# Patient Record
Sex: MALE | Race: WHITE | NOT HISPANIC OR LATINO | URBAN - NONMETROPOLITAN AREA
[De-identification: names, ages, dates, MRNs, and addresses within clinical notes are randomized per-mention and may not be internally consistent; named-entity substitution may affect disease eponyms.]

---

## 2023-08-01 ENCOUNTER — HOSPITAL ENCOUNTER (INPATIENT)
Facility: HOSPITAL | Age: 38
LOS: 2 days | Discharge: PSYCHIATRIC OR SUBSTANCE USE FACILITY | DRG: 897 | End: 2023-08-03
Attending: STUDENT IN AN ORGANIZED HEALTH CARE EDUCATION/TRAINING PROGRAM | Admitting: HOSPITALIST
Payer: COMMERCIAL

## 2023-08-01 DIAGNOSIS — F10.931 ALCOHOL WITHDRAWAL SYNDROME, WITH DELIRIUM: Primary | ICD-10-CM

## 2023-08-01 PROBLEM — F10.929 ALCOHOL INTOXICATION: Status: ACTIVE | Noted: 2023-08-01

## 2023-08-01 LAB
ALBUMIN SERPL-MCNC: 4.8 G/DL (ref 3.5–5.2)
ALBUMIN/GLOB SERPL: 1.7 G/DL
ALP SERPL-CCNC: 61 U/L (ref 39–117)
ALT SERPL W P-5'-P-CCNC: 47 U/L (ref 1–41)
AMPHET+METHAMPHET UR QL: NEGATIVE
AMPHETAMINES UR QL: NEGATIVE
ANION GAP SERPL CALCULATED.3IONS-SCNC: 19.4 MMOL/L (ref 5–15)
APAP SERPL-MCNC: <5 MCG/ML (ref 0–30)
AST SERPL-CCNC: 92 U/L (ref 1–40)
BARBITURATES UR QL SCN: NEGATIVE
BASOPHILS # BLD AUTO: 0.04 10*3/MM3 (ref 0–0.2)
BASOPHILS NFR BLD AUTO: 1.5 % (ref 0–1.5)
BENZODIAZ UR QL SCN: NEGATIVE
BILIRUB SERPL-MCNC: 0.4 MG/DL (ref 0–1.2)
BUN SERPL-MCNC: 12 MG/DL (ref 6–20)
BUN/CREAT SERPL: 19.4 (ref 7–25)
BUPRENORPHINE SERPL-MCNC: NEGATIVE NG/ML
CALCIUM SPEC-SCNC: 9.5 MG/DL (ref 8.6–10.5)
CANNABINOIDS SERPL QL: NEGATIVE
CHLORIDE SERPL-SCNC: 99 MMOL/L (ref 98–107)
CK SERPL-CCNC: 112 U/L (ref 20–200)
CO2 SERPL-SCNC: 26.6 MMOL/L (ref 22–29)
COCAINE UR QL: NEGATIVE
CREAT SERPL-MCNC: 0.62 MG/DL (ref 0.76–1.27)
D-LACTATE SERPL-SCNC: 3.3 MMOL/L (ref 0.5–2)
DEPRECATED RDW RBC AUTO: 52 FL (ref 37–54)
EGFRCR SERPLBLD CKD-EPI 2021: 124.7 ML/MIN/1.73
EOSINOPHIL # BLD AUTO: 0.01 10*3/MM3 (ref 0–0.4)
EOSINOPHIL NFR BLD AUTO: 0.4 % (ref 0.3–6.2)
ERYTHROCYTE [DISTWIDTH] IN BLOOD BY AUTOMATED COUNT: 14.5 % (ref 12.3–15.4)
ETHANOL BLD-MCNC: 574 MG/DL (ref 0–10)
ETHANOL UR QL: 0.57 %
FENTANYL UR-MCNC: NEGATIVE NG/ML
GLOBULIN UR ELPH-MCNC: 2.9 GM/DL
GLUCOSE BLDC GLUCOMTR-MCNC: 134 MG/DL (ref 70–130)
GLUCOSE SERPL-MCNC: 147 MG/DL (ref 65–99)
HCT VFR BLD AUTO: 40.2 % (ref 37.5–51)
HGB BLD-MCNC: 13.7 G/DL (ref 13–17.7)
HOLD SPECIMEN: NORMAL
HOLD SPECIMEN: NORMAL
IMM GRANULOCYTES # BLD AUTO: 0 10*3/MM3 (ref 0–0.05)
IMM GRANULOCYTES NFR BLD AUTO: 0 % (ref 0–0.5)
LIPASE SERPL-CCNC: 80 U/L (ref 13–60)
LYMPHOCYTES # BLD AUTO: 1.01 10*3/MM3 (ref 0.7–3.1)
LYMPHOCYTES NFR BLD AUTO: 38 % (ref 19.6–45.3)
MCH RBC QN AUTO: 33 PG (ref 26.6–33)
MCHC RBC AUTO-ENTMCNC: 34.1 G/DL (ref 31.5–35.7)
MCV RBC AUTO: 96.9 FL (ref 79–97)
METHADONE UR QL SCN: NEGATIVE
MONOCYTES # BLD AUTO: 0.25 10*3/MM3 (ref 0.1–0.9)
MONOCYTES NFR BLD AUTO: 9.4 % (ref 5–12)
NEUTROPHILS NFR BLD AUTO: 1.35 10*3/MM3 (ref 1.7–7)
NEUTROPHILS NFR BLD AUTO: 50.7 % (ref 42.7–76)
NRBC BLD AUTO-RTO: 0 /100 WBC (ref 0–0.2)
OPIATES UR QL: NEGATIVE
OXYCODONE UR QL SCN: NEGATIVE
PCP UR QL SCN: NEGATIVE
PLATELET # BLD AUTO: 149 10*3/MM3 (ref 140–450)
PMV BLD AUTO: 10.3 FL (ref 6–12)
POTASSIUM SERPL-SCNC: 3.9 MMOL/L (ref 3.5–5.2)
PROPOXYPH UR QL: NEGATIVE
PROT SERPL-MCNC: 7.7 G/DL (ref 6–8.5)
RBC # BLD AUTO: 4.15 10*6/MM3 (ref 4.14–5.8)
SALICYLATES SERPL-MCNC: <0.3 MG/DL
SODIUM SERPL-SCNC: 145 MMOL/L (ref 136–145)
TRICYCLICS UR QL SCN: NEGATIVE
TSH SERPL DL<=0.05 MIU/L-ACNC: 0.61 UIU/ML (ref 0.27–4.2)
WBC NRBC COR # BLD: 2.66 10*3/MM3 (ref 3.4–10.8)
WHOLE BLOOD HOLD COAG: NORMAL
WHOLE BLOOD HOLD SPECIMEN: NORMAL

## 2023-08-01 PROCEDURE — 25010000002 THIAMINE PER 100 MG: Performed by: STUDENT IN AN ORGANIZED HEALTH CARE EDUCATION/TRAINING PROGRAM

## 2023-08-01 PROCEDURE — 93005 ELECTROCARDIOGRAM TRACING: CPT | Performed by: HOSPITALIST

## 2023-08-01 PROCEDURE — HZ2ZZZZ DETOXIFICATION SERVICES FOR SUBSTANCE ABUSE TREATMENT: ICD-10-PCS | Performed by: STUDENT IN AN ORGANIZED HEALTH CARE EDUCATION/TRAINING PROGRAM

## 2023-08-01 PROCEDURE — 80050 GENERAL HEALTH PANEL: CPT | Performed by: STUDENT IN AN ORGANIZED HEALTH CARE EDUCATION/TRAINING PROGRAM

## 2023-08-01 PROCEDURE — 82948 REAGENT STRIP/BLOOD GLUCOSE: CPT

## 2023-08-01 PROCEDURE — 83605 ASSAY OF LACTIC ACID: CPT | Performed by: STUDENT IN AN ORGANIZED HEALTH CARE EDUCATION/TRAINING PROGRAM

## 2023-08-01 PROCEDURE — 82077 ASSAY SPEC XCP UR&BREATH IA: CPT | Performed by: STUDENT IN AN ORGANIZED HEALTH CARE EDUCATION/TRAINING PROGRAM

## 2023-08-01 PROCEDURE — 80143 DRUG ASSAY ACETAMINOPHEN: CPT | Performed by: STUDENT IN AN ORGANIZED HEALTH CARE EDUCATION/TRAINING PROGRAM

## 2023-08-01 PROCEDURE — 80179 DRUG ASSAY SALICYLATE: CPT | Performed by: STUDENT IN AN ORGANIZED HEALTH CARE EDUCATION/TRAINING PROGRAM

## 2023-08-01 PROCEDURE — 80307 DRUG TEST PRSMV CHEM ANLYZR: CPT | Performed by: STUDENT IN AN ORGANIZED HEALTH CARE EDUCATION/TRAINING PROGRAM

## 2023-08-01 PROCEDURE — 25010000002 LORAZEPAM PER 2 MG: Performed by: STUDENT IN AN ORGANIZED HEALTH CARE EDUCATION/TRAINING PROGRAM

## 2023-08-01 PROCEDURE — 93010 ELECTROCARDIOGRAM REPORT: CPT | Performed by: SPECIALIST

## 2023-08-01 PROCEDURE — 83690 ASSAY OF LIPASE: CPT | Performed by: HOSPITALIST

## 2023-08-01 PROCEDURE — 99285 EMERGENCY DEPT VISIT HI MDM: CPT

## 2023-08-01 PROCEDURE — 82550 ASSAY OF CK (CPK): CPT | Performed by: STUDENT IN AN ORGANIZED HEALTH CARE EDUCATION/TRAINING PROGRAM

## 2023-08-01 RX ORDER — IBUPROFEN 800 MG/1
800 TABLET ORAL EVERY 8 HOURS PRN
COMMUNITY

## 2023-08-01 RX ORDER — LORAZEPAM 1 MG/1
1 TABLET ORAL
Status: DISCONTINUED | OUTPATIENT
Start: 2023-08-01 | End: 2023-08-03 | Stop reason: HOSPADM

## 2023-08-01 RX ORDER — BUSPIRONE HYDROCHLORIDE 15 MG/1
15 TABLET ORAL 3 TIMES DAILY PRN
COMMUNITY

## 2023-08-01 RX ORDER — LORAZEPAM 2 MG/1
4 TABLET ORAL
Status: DISCONTINUED | OUTPATIENT
Start: 2023-08-01 | End: 2023-08-03 | Stop reason: HOSPADM

## 2023-08-01 RX ORDER — FOLIC ACID 1 MG/1
1 TABLET ORAL DAILY
Status: DISCONTINUED | OUTPATIENT
Start: 2023-08-01 | End: 2023-08-01

## 2023-08-01 RX ORDER — THIAMINE HYDROCHLORIDE 100 MG/ML
200 INJECTION, SOLUTION INTRAMUSCULAR; INTRAVENOUS EVERY 8 HOURS SCHEDULED
Status: DISCONTINUED | OUTPATIENT
Start: 2023-08-04 | End: 2023-08-01

## 2023-08-01 RX ORDER — ESCITALOPRAM OXALATE 10 MG/1
10 TABLET ORAL DAILY
COMMUNITY

## 2023-08-01 RX ORDER — TRAZODONE HYDROCHLORIDE 50 MG/1
50-100 TABLET ORAL NIGHTLY PRN
Status: ON HOLD | COMMUNITY
End: 2023-08-08 | Stop reason: SDUPTHER

## 2023-08-01 RX ORDER — DIPHENOXYLATE HYDROCHLORIDE AND ATROPINE SULFATE 2.5; .025 MG/1; MG/1
1 TABLET ORAL DAILY
COMMUNITY

## 2023-08-01 RX ORDER — LORAZEPAM 2 MG/ML
1 INJECTION INTRAMUSCULAR EVERY 6 HOURS
Status: DISCONTINUED | OUTPATIENT
Start: 2023-08-02 | End: 2023-08-01

## 2023-08-01 RX ORDER — LEVETIRACETAM 500 MG/1
500 TABLET ORAL 2 TIMES DAILY
COMMUNITY

## 2023-08-01 RX ORDER — SODIUM CHLORIDE 0.9 % (FLUSH) 0.9 %
10 SYRINGE (ML) INJECTION AS NEEDED
Status: DISCONTINUED | OUTPATIENT
Start: 2023-08-01 | End: 2023-08-03 | Stop reason: HOSPADM

## 2023-08-01 RX ORDER — LORAZEPAM 2 MG/ML
2 INJECTION INTRAMUSCULAR
Status: DISCONTINUED | OUTPATIENT
Start: 2023-08-01 | End: 2023-08-03 | Stop reason: HOSPADM

## 2023-08-01 RX ORDER — LORAZEPAM 2 MG/1
2 TABLET ORAL
Status: DISCONTINUED | OUTPATIENT
Start: 2023-08-01 | End: 2023-08-03 | Stop reason: HOSPADM

## 2023-08-01 RX ORDER — NICOTINE 21 MG/24HR
1 PATCH, TRANSDERMAL 24 HOURS TRANSDERMAL ONCE
Status: COMPLETED | OUTPATIENT
Start: 2023-08-01 | End: 2023-08-02

## 2023-08-01 RX ORDER — ALBUTEROL SULFATE 90 UG/1
2 AEROSOL, METERED RESPIRATORY (INHALATION) EVERY 4 HOURS PRN
COMMUNITY

## 2023-08-01 RX ORDER — LORAZEPAM 2 MG/ML
1 INJECTION INTRAMUSCULAR
Status: DISCONTINUED | OUTPATIENT
Start: 2023-08-01 | End: 2023-08-03 | Stop reason: HOSPADM

## 2023-08-01 RX ORDER — LORAZEPAM 2 MG/ML
2 INJECTION INTRAMUSCULAR EVERY 6 HOURS
Status: DISCONTINUED | OUTPATIENT
Start: 2023-08-01 | End: 2023-08-01

## 2023-08-01 RX ORDER — SODIUM CHLORIDE, SODIUM LACTATE, POTASSIUM CHLORIDE, CALCIUM CHLORIDE 600; 310; 30; 20 MG/100ML; MG/100ML; MG/100ML; MG/100ML
100 INJECTION, SOLUTION INTRAVENOUS CONTINUOUS
Status: ACTIVE | OUTPATIENT
Start: 2023-08-01 | End: 2023-08-02

## 2023-08-01 RX ORDER — LORAZEPAM 2 MG/ML
4 INJECTION INTRAMUSCULAR
Status: DISCONTINUED | OUTPATIENT
Start: 2023-08-01 | End: 2023-08-03 | Stop reason: HOSPADM

## 2023-08-01 RX ORDER — METHION/INOS/CHOL BT/B COM/LIV 110MG-86MG
100 CAPSULE ORAL DAILY
Status: DISCONTINUED | OUTPATIENT
Start: 2023-08-09 | End: 2023-08-01

## 2023-08-01 RX ADMIN — THIAMINE HYDROCHLORIDE 500 MG: 100 INJECTION, SOLUTION INTRAMUSCULAR; INTRAVENOUS at 22:13

## 2023-08-01 RX ADMIN — Medication 1 PATCH: at 21:09

## 2023-08-01 RX ADMIN — SODIUM CHLORIDE 2000 ML: 9 INJECTION, SOLUTION INTRAVENOUS at 21:08

## 2023-08-01 RX ADMIN — LORAZEPAM 1 MG: 2 INJECTION INTRAMUSCULAR; INTRAVENOUS at 21:09

## 2023-08-01 RX ADMIN — SODIUM CHLORIDE, POTASSIUM CHLORIDE, SODIUM LACTATE AND CALCIUM CHLORIDE 100 ML/HR: 600; 310; 30; 20 INJECTION, SOLUTION INTRAVENOUS at 23:14

## 2023-08-02 PROBLEM — E43 SEVERE MALNUTRITION: Status: ACTIVE | Noted: 2023-08-02

## 2023-08-02 LAB
ALBUMIN SERPL-MCNC: 4.2 G/DL (ref 3.5–5.2)
ALBUMIN/GLOB SERPL: 1.8 G/DL
ALP SERPL-CCNC: 52 U/L (ref 39–117)
ALT SERPL W P-5'-P-CCNC: 39 U/L (ref 1–41)
ANION GAP SERPL CALCULATED.3IONS-SCNC: 15 MMOL/L (ref 5–15)
AST SERPL-CCNC: 77 U/L (ref 1–40)
BASOPHILS # BLD AUTO: 0.04 10*3/MM3 (ref 0–0.2)
BASOPHILS NFR BLD AUTO: 1.5 % (ref 0–1.5)
BILIRUB SERPL-MCNC: 0.4 MG/DL (ref 0–1.2)
BUN SERPL-MCNC: 6 MG/DL (ref 6–20)
BUN/CREAT SERPL: 15.8 (ref 7–25)
CALCIUM SPEC-SCNC: 7.8 MG/DL (ref 8.6–10.5)
CHLORIDE SERPL-SCNC: 103 MMOL/L (ref 98–107)
CO2 SERPL-SCNC: 24 MMOL/L (ref 22–29)
CREAT SERPL-MCNC: 0.38 MG/DL (ref 0.76–1.27)
D-LACTATE SERPL-SCNC: 2 MMOL/L (ref 0.5–2)
D-LACTATE SERPL-SCNC: 2.5 MMOL/L (ref 0.5–2)
DEPRECATED RDW RBC AUTO: 53.7 FL (ref 37–54)
EGFRCR SERPLBLD CKD-EPI 2021: 144.6 ML/MIN/1.73
EOSINOPHIL # BLD AUTO: 0.05 10*3/MM3 (ref 0–0.4)
EOSINOPHIL NFR BLD AUTO: 1.8 % (ref 0.3–6.2)
ERYTHROCYTE [DISTWIDTH] IN BLOOD BY AUTOMATED COUNT: 14.5 % (ref 12.3–15.4)
GLOBULIN UR ELPH-MCNC: 2.3 GM/DL
GLUCOSE SERPL-MCNC: 82 MG/DL (ref 65–99)
HCT VFR BLD AUTO: 37.5 % (ref 37.5–51)
HGB BLD-MCNC: 12.4 G/DL (ref 13–17.7)
IMM GRANULOCYTES # BLD AUTO: 0 10*3/MM3 (ref 0–0.05)
IMM GRANULOCYTES NFR BLD AUTO: 0 % (ref 0–0.5)
LYMPHOCYTES # BLD AUTO: 1.41 10*3/MM3 (ref 0.7–3.1)
LYMPHOCYTES NFR BLD AUTO: 51.8 % (ref 19.6–45.3)
MAGNESIUM SERPL-MCNC: 1.3 MG/DL (ref 1.6–2.6)
MCH RBC QN AUTO: 33 PG (ref 26.6–33)
MCHC RBC AUTO-ENTMCNC: 33.1 G/DL (ref 31.5–35.7)
MCV RBC AUTO: 99.7 FL (ref 79–97)
MONOCYTES # BLD AUTO: 0.21 10*3/MM3 (ref 0.1–0.9)
MONOCYTES NFR BLD AUTO: 7.7 % (ref 5–12)
NEUTROPHILS NFR BLD AUTO: 1.01 10*3/MM3 (ref 1.7–7)
NEUTROPHILS NFR BLD AUTO: 37.2 % (ref 42.7–76)
NRBC BLD AUTO-RTO: 0 /100 WBC (ref 0–0.2)
PLATELET # BLD AUTO: 118 10*3/MM3 (ref 140–450)
PMV BLD AUTO: 10.7 FL (ref 6–12)
POTASSIUM SERPL-SCNC: 3.9 MMOL/L (ref 3.5–5.2)
PROT SERPL-MCNC: 6.5 G/DL (ref 6–8.5)
RBC # BLD AUTO: 3.76 10*6/MM3 (ref 4.14–5.8)
SODIUM SERPL-SCNC: 142 MMOL/L (ref 136–145)
WBC NRBC COR # BLD: 2.72 10*3/MM3 (ref 3.4–10.8)

## 2023-08-02 PROCEDURE — 80177 DRUG SCRN QUAN LEVETIRACETAM: CPT | Performed by: STUDENT IN AN ORGANIZED HEALTH CARE EDUCATION/TRAINING PROGRAM

## 2023-08-02 PROCEDURE — 80053 COMPREHEN METABOLIC PANEL: CPT | Performed by: HOSPITALIST

## 2023-08-02 PROCEDURE — 85025 COMPLETE CBC W/AUTO DIFF WBC: CPT | Performed by: HOSPITALIST

## 2023-08-02 PROCEDURE — 94799 UNLISTED PULMONARY SVC/PX: CPT

## 2023-08-02 PROCEDURE — 83605 ASSAY OF LACTIC ACID: CPT | Performed by: STUDENT IN AN ORGANIZED HEALTH CARE EDUCATION/TRAINING PROGRAM

## 2023-08-02 PROCEDURE — 99222 1ST HOSP IP/OBS MODERATE 55: CPT | Performed by: PSYCHIATRY & NEUROLOGY

## 2023-08-02 PROCEDURE — 94664 DEMO&/EVAL PT USE INHALER: CPT

## 2023-08-02 PROCEDURE — 25010000002 LORAZEPAM PER 2 MG: Performed by: HOSPITALIST

## 2023-08-02 PROCEDURE — 25010000002 THIAMINE PER 100 MG: Performed by: HOSPITALIST

## 2023-08-02 PROCEDURE — 83735 ASSAY OF MAGNESIUM: CPT | Performed by: HOSPITALIST

## 2023-08-02 PROCEDURE — 25010000002 MAGNESIUM SULFATE 2 GM/50ML SOLUTION: Performed by: HOSPITALIST

## 2023-08-02 PROCEDURE — 94761 N-INVAS EAR/PLS OXIMETRY MLT: CPT

## 2023-08-02 RX ORDER — SODIUM CHLORIDE, SODIUM LACTATE, POTASSIUM CHLORIDE, CALCIUM CHLORIDE 600; 310; 30; 20 MG/100ML; MG/100ML; MG/100ML; MG/100ML
100 INJECTION, SOLUTION INTRAVENOUS CONTINUOUS
Status: DISCONTINUED | OUTPATIENT
Start: 2023-08-02 | End: 2023-08-03

## 2023-08-02 RX ORDER — BISACODYL 5 MG/1
5 TABLET, DELAYED RELEASE ORAL DAILY PRN
Status: DISCONTINUED | OUTPATIENT
Start: 2023-08-02 | End: 2023-08-03 | Stop reason: HOSPADM

## 2023-08-02 RX ORDER — LEVETIRACETAM 500 MG/1
500 TABLET ORAL 2 TIMES DAILY
Status: DISCONTINUED | OUTPATIENT
Start: 2023-08-02 | End: 2023-08-03 | Stop reason: HOSPADM

## 2023-08-02 RX ORDER — METHION/INOS/CHOL BT/B COM/LIV 110MG-86MG
100 CAPSULE ORAL DAILY
Status: CANCELLED | OUTPATIENT
Start: 2023-08-02

## 2023-08-02 RX ORDER — SODIUM CHLORIDE 0.9 % (FLUSH) 0.9 %
10 SYRINGE (ML) INJECTION EVERY 12 HOURS SCHEDULED
Status: DISCONTINUED | OUTPATIENT
Start: 2023-08-02 | End: 2023-08-03 | Stop reason: HOSPADM

## 2023-08-02 RX ORDER — NITROGLYCERIN 0.4 MG/1
0.4 TABLET SUBLINGUAL
Status: DISCONTINUED | OUTPATIENT
Start: 2023-08-02 | End: 2023-08-03 | Stop reason: HOSPADM

## 2023-08-02 RX ORDER — TRAZODONE HYDROCHLORIDE 50 MG/1
50 TABLET ORAL NIGHTLY PRN
Status: DISCONTINUED | OUTPATIENT
Start: 2023-08-02 | End: 2023-08-03 | Stop reason: HOSPADM

## 2023-08-02 RX ORDER — AMOXICILLIN 250 MG
2 CAPSULE ORAL 2 TIMES DAILY
Status: DISCONTINUED | OUTPATIENT
Start: 2023-08-02 | End: 2023-08-03 | Stop reason: HOSPADM

## 2023-08-02 RX ORDER — BISACODYL 10 MG
10 SUPPOSITORY, RECTAL RECTAL DAILY PRN
Status: DISCONTINUED | OUTPATIENT
Start: 2023-08-02 | End: 2023-08-03 | Stop reason: HOSPADM

## 2023-08-02 RX ORDER — ALBUTEROL SULFATE 2.5 MG/3ML
2.5 SOLUTION RESPIRATORY (INHALATION) EVERY 4 HOURS PRN
Status: DISCONTINUED | OUTPATIENT
Start: 2023-08-02 | End: 2023-08-03 | Stop reason: HOSPADM

## 2023-08-02 RX ORDER — IBUPROFEN 800 MG/1
800 TABLET ORAL EVERY 8 HOURS PRN
Status: CANCELLED | OUTPATIENT
Start: 2023-08-02

## 2023-08-02 RX ORDER — ESCITALOPRAM OXALATE 10 MG/1
10 TABLET ORAL DAILY
Status: DISCONTINUED | OUTPATIENT
Start: 2023-08-02 | End: 2023-08-03 | Stop reason: HOSPADM

## 2023-08-02 RX ORDER — DIPHENOXYLATE HYDROCHLORIDE AND ATROPINE SULFATE 2.5; .025 MG/1; MG/1
1 TABLET ORAL DAILY
Status: CANCELLED | OUTPATIENT
Start: 2023-08-02

## 2023-08-02 RX ORDER — NICOTINE 21 MG/24HR
1 PATCH, TRANSDERMAL 24 HOURS TRANSDERMAL EVERY 24 HOURS
Status: DISCONTINUED | OUTPATIENT
Start: 2023-08-02 | End: 2023-08-03 | Stop reason: HOSPADM

## 2023-08-02 RX ORDER — SODIUM CHLORIDE 0.9 % (FLUSH) 0.9 %
10 SYRINGE (ML) INJECTION AS NEEDED
Status: DISCONTINUED | OUTPATIENT
Start: 2023-08-02 | End: 2023-08-03 | Stop reason: HOSPADM

## 2023-08-02 RX ORDER — DIPHENOXYLATE HYDROCHLORIDE AND ATROPINE SULFATE 2.5; .025 MG/1; MG/1
1 TABLET ORAL DAILY
Status: DISCONTINUED | OUTPATIENT
Start: 2023-08-02 | End: 2023-08-03 | Stop reason: HOSPADM

## 2023-08-02 RX ORDER — MAGNESIUM SULFATE HEPTAHYDRATE 40 MG/ML
2 INJECTION, SOLUTION INTRAVENOUS
Status: COMPLETED | OUTPATIENT
Start: 2023-08-02 | End: 2023-08-02

## 2023-08-02 RX ORDER — POLYETHYLENE GLYCOL 3350 17 G/17G
17 POWDER, FOR SOLUTION ORAL DAILY PRN
Status: DISCONTINUED | OUTPATIENT
Start: 2023-08-02 | End: 2023-08-03 | Stop reason: HOSPADM

## 2023-08-02 RX ORDER — SODIUM CHLORIDE 9 MG/ML
40 INJECTION, SOLUTION INTRAVENOUS AS NEEDED
Status: DISCONTINUED | OUTPATIENT
Start: 2023-08-02 | End: 2023-08-03 | Stop reason: HOSPADM

## 2023-08-02 RX ADMIN — LORAZEPAM 2 MG: 2 INJECTION INTRAMUSCULAR; INTRAVENOUS at 21:18

## 2023-08-02 RX ADMIN — THIAMINE HYDROCHLORIDE 100 ML/HR: 100 INJECTION, SOLUTION INTRAMUSCULAR; INTRAVENOUS at 21:14

## 2023-08-02 RX ADMIN — SODIUM CHLORIDE, POTASSIUM CHLORIDE, SODIUM LACTATE AND CALCIUM CHLORIDE 100 ML/HR: 600; 310; 30; 20 INJECTION, SOLUTION INTRAVENOUS at 23:22

## 2023-08-02 RX ADMIN — SODIUM CHLORIDE, POTASSIUM CHLORIDE, SODIUM LACTATE AND CALCIUM CHLORIDE 100 ML/HR: 600; 310; 30; 20 INJECTION, SOLUTION INTRAVENOUS at 12:01

## 2023-08-02 RX ADMIN — MAGNESIUM SULFATE HEPTAHYDRATE 2 G: 40 INJECTION, SOLUTION INTRAVENOUS at 05:02

## 2023-08-02 RX ADMIN — LEVETIRACETAM 500 MG: 500 TABLET, FILM COATED ORAL at 09:27

## 2023-08-02 RX ADMIN — Medication 10 ML: at 09:27

## 2023-08-02 RX ADMIN — Medication 1 TABLET: at 09:27

## 2023-08-02 RX ADMIN — LORAZEPAM 2 MG: 2 INJECTION INTRAMUSCULAR; INTRAVENOUS at 15:27

## 2023-08-02 RX ADMIN — THIAMINE HYDROCHLORIDE 100 ML/HR: 100 INJECTION, SOLUTION INTRAMUSCULAR; INTRAVENOUS at 00:30

## 2023-08-02 RX ADMIN — MAGNESIUM SULFATE HEPTAHYDRATE 2 G: 40 INJECTION, SOLUTION INTRAVENOUS at 09:27

## 2023-08-02 RX ADMIN — MAGNESIUM SULFATE HEPTAHYDRATE 2 G: 40 INJECTION, SOLUTION INTRAVENOUS at 06:54

## 2023-08-02 RX ADMIN — LEVETIRACETAM 500 MG: 500 TABLET, FILM COATED ORAL at 21:14

## 2023-08-02 RX ADMIN — ESCITALOPRAM 10 MG: 10 TABLET, FILM COATED ORAL at 09:27

## 2023-08-02 RX ADMIN — Medication 10 ML: at 21:19

## 2023-08-02 NOTE — PLAN OF CARE
Goal Outcome Evaluation:               Patient admitted to CCU. Pt is alert and oriented and very pleasant. IVF infusing. VSS, urinal within reach.  Bed alarm set. Will continue with plan of care.
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
## 2023-08-02 NOTE — PROGRESS NOTES
[facility] HOSPITALIST PROGRESS NOTE     Patient Identification:  Name:  Sachin Limon  Age:  38 y.o.  Sex:  male  :  1985  MRN:  [id number]  Visit Number:  [id number]  ROOM: [address]     Primary Care Provider:  Provider, No Known     Date of Admission: 2023    Length of stay in inpatient status:  1    Subjective     Chief Compliant:    Chief Complaint   Patient presents with    Alcohol Problem       Patient seen at bedside this am ambulating with standby assist to commode in room with resting and intention tremor noted without significant asterixis. Voice mildly tremulous. Otherwise patient without acute complaints. No nausea or diarrhea reported, no abd pain.        Objective     Current Hospital Meds:  escitalopram, 10 mg, Oral, Daily  levETIRAcetam, 500 mg, Oral, BID  multivitamin, 1 tablet, Oral, Daily  nicotine, 1 patch, Transdermal, Once  nicotine, 1 patch, Transdermal, Q24H  senna-docusate sodium, 2 tablet, Oral, BID  sodium chloride, 10 mL, Intravenous, Q12H  IV Fluids 1000 mL + additives, 100 mL/hr, Intravenous, Nightly    lactated ringers, 100 mL/hr, Last Rate: 100 mL/hr (23 1201)      Current Antimicrobial Therapy:  Anti-Infectives (From admission, onward)      None          Current Diuretic Therapy:  Diuretics (From admission, onward)      None          ----------------------------------------------------------------------------------------------------------------------  Vital Signs:  Temp:  [97.7 øF (36.5 øC)-98.6 øF (37 øC)] 98.4 øF (36.9 øC)  Heart Rate:  [] 81  Resp:  [12-19] 18  BP: (111-152)/(74-97) 138/90  SpO2:  [87 %-100 %] 95 %  on  Flow (L/min):  [2] 2;   Device (Oxygen Therapy): nasal cannula  Body mass index is 18.65 kg/mý.    Wt Readings from Last 3 Encounters:   23 54 kg (119 lb 0.8 oz)     Intake & Output (last 3 days)          07 07 07 07 07 0700    P.O.    564    I.V.  (mL/kg)   1406.7 (26)     IV Piggyback   2100     Total Intake(mL/kg)   3506.7 (64.9) 564 (10.4)    Urine (mL/kg/hr)   300 700 (1.8)    Stool    0    Total Output   300 700    Net   +3206.7 -136            Stool Unmeasured Occurrence    1 x          Diet: Regular/House Diet; Texture: Regular Texture (IDDSI 7); Fluid Consistency: Thin (IDDSI 0)  ----------------------------------------------------------------------------------------------------------------------  Physical Exam  Vitals and nursing note reviewed.   Constitutional:       General: He is not in acute distress.  HENT:      Mouth/Throat:      Mouth: Mucous membranes are dry.      Pharynx: Oropharynx is clear.   Eyes:      General: No scleral icterus.     Extraocular Movements: Extraocular movements intact.      Comments: Conjunctival erythema without discharge   Cardiovascular:      Rate and Rhythm: Normal rate.   Pulmonary:      Effort: Pulmonary effort is normal. No respiratory distress.   Abdominal:      General: Abdomen is flat.      Palpations: Abdomen is soft.   Musculoskeletal:      Right lower leg: No edema.      Left lower leg: No edema.   Skin:     Coloration: Skin is not jaundiced or pale.   Neurological:      General: No focal deficit present.      Mental Status: He is alert and oriented to person, place, and time.   Psychiatric:         Behavior: Behavior normal.         Thought Content: Thought content normal.     ----------------------------------------------------------------------------------------------------------------------    ----------------------------------------------------------------------------------------------------------------------  LABS:    CBC and coagulation:  Results from last 7 days   Lab Units 08/02/23  0352 08/02/23  0041 08/01/23 2115 08/01/23 2039   LACTATE mmol/L 2.0 2.5* 3.3*  --    WBC 10*3/mm3 2.72*  --   --  2.66*   HEMOGLOBIN g/dL 12.4*  --   --  13.7   HEMATOCRIT % 37.5  --   --  40.2   MCV fL 99.7*  --    --  96.9   MCHC g/dL 33.1  --   --  34.1   PLATELETS 10*3/mm3 118*  --   --  149     Acid/base balance:      Renal and electrolytes:  Results from last 7 days   Lab Units 08/02/23  0352 08/01/23 2039   SODIUM mmol/L 142 145   POTASSIUM mmol/L 3.9 3.9   MAGNESIUM mg/dL 1.3*  --    CHLORIDE mmol/L 103 99   CO2 mmol/L 24.0 26.6   BUN mg/dL 6 12   CREATININE mg/dL 0.38* 0.62*   CALCIUM mg/dL 7.8* 9.5   GLUCOSE mg/dL 82 147*     Estimated Creatinine Clearance: 199.3 mL/min (A) (by C-G formula based on SCr of 0.38 mg/dL (L)).    Liver and pancreatic function:  Results from last 7 days   Lab Units 08/02/23  0352 08/01/23 2039   ALBUMIN g/dL 4.2 4.8   BILIRUBIN mg/dL 0.4 0.4   ALK PHOS U/L 52 61   AST (SGOT) U/L 77* 92*   ALT (SGPT) U/L 39 47*   LIPASE U/L  --  80*     Endocrine function:  No results found for: HGBA1C  Point of care bedside glucose levels:  Results from last 7 days   Lab Units 08/01/23 2050   GLUCOSE mg/dL 134*     Glucose levels from the [facility]:  Results from last 7 days   Lab Units 08/02/23  0352 08/01/23 2039   GLUCOSE mg/dL 82 147*     Lab Results   Component Value Date    TSH 0.606 08/01/2023     Cardiac:  Results from last 7 days   Lab Units 08/01/23 2039   CK TOTAL U/L 112       Cultures:  No results found for: COLORU, CLARITYU, SPECGRAV, PHUR, PROTEINUR, GLUCOSEU, KETONESU, BLOODU, NITRITEU, LEUKOCYTESUR, BILIRUBINUR, UROBILINOGEN, RBCUA, WBCUA, BACTERIA, UACOMMENT  Microbiology Results (last 10 days)       ** No results found for the last 240 hours. **            No results found for: PREGTESTUR, PREGSERUM, HCG, HCGQUANT  Pain Management Panel          Latest Ref Rng & Units 8/1/2023   Pain Management Panel   Amphetamine, Urine Qual Negative Negative    Barbiturates Screen, Urine Negative Negative    Benzodiazepine Screen, Urine Negative Negative    Buprenorphine, Screen, Urine Negative Negative    Cocaine Screen, Urine Negative Negative    Fentanyl, Urine Negative Negative    Methadone Screen ,  Urine Negative Negative    Methamphetamine, Ur Negative Negative        I have personally looked at the labs and they are summarized above.  ----------------------------------------------------------------------------------------------------------------------  Detailed radiology reports for the last 24 hours:    Imaging Results (Last 24 Hours)       ** No results found for the last 24 hours. **            Assessment & Plan      #Acute alcohol intoxication with withdrawal, hx of Dts  #Chronic alcohol abuse w/dependence   -CIWA scores relatively mild, will cont prn symptom driven ativan withdrawal protocol as patient has noted sedation with ativan dosing. Can escalate to gil dosing if CIWA scores climbing despite prn dosing per protocol  -Agree with cont IVF and b-vit supplementation  -PO multivitamin resumed  -Discuss cessation aides prior to discharge, planning to go to [facility] once CIWA improving and no evidence of DTs >24hrs  -Psych evaluated patient and will follow, appreciate input    #Epilepsy vs withdrawal sz hx  -On chronic keppra without no up to date fill hx  -Added keppra level and resumed maintenance dose. Not loaded for now given ativan dosing and lack of elevated CIWA currently or concern for sz activity    #Pancytopenia  -Secondary to alcohol intake, monitor on repeat but of low clinical concern currently    #Hypomagnesemia  -Replace per protocol    #MDD  -Cont home lexapro    VTE Prophylaxis:   Mechanical Order History:        Ordered        08/02/23 0211  Place Sequential Compression Device  Once            08/02/23 0211  Maintain Sequential Compression Device  Continuous                          Pharmalogical Order History:       None            Code Status and Medical Interventions:   Ordered at: 08/01/23 2402     Level Of Support Discussed With:    Patient     Code Status (Patient has no pulse and is not breathing):    CPR (Attempt to Resuscitate)     Medical Interventions (Patient has  pulse or is breathing):    Full Support     Release to patient:    Routine Release         Disposition: Downgrade once CIWA stable >24hrs    I have reviewed any copied/forwarded text or data, verified its accuracy, and updated as necessary above.    Silvano Gonzales MD  [place] Hospitalist  08/02/23  14:02 EDT

## 2023-08-02 NOTE — PLAN OF CARE
Goal Outcome Evaluation:                      Pt able to walk to commode with stand by assist.  Oxygen on for when pt is sleeping and occassionally desats.  NADN at this time.

## 2023-08-02 NOTE — PROGRESS NOTES
Malnutrition Severity Assessment      Patient meets criteria for : Severe Malnutrition  Malnutrition Type (last 8 hours)       Malnutrition Severity Assessment       Row Name 08/02/23 1427       Malnutrition Severity Assessment    Malnutrition Type Chronic Disease - Related Malnutrition      Row Name 08/02/23 1427       Insufficient Energy Intake     Insufficient Energy Intake Findings Severe    Insufficient Energy Intake  <75% of est. energy requirement for >7d)      Row Name 08/02/23 1427       Unintentional Weight Loss     Unintentional Weight Loss Findings Severe    Unintentional Weight Loss  Weight loss greater than 7.5% in three months      Row Name 08/02/23 1427       Muscle Loss    Loss of Muscle Mass Findings Moderate    [place] Region Moderate - slight depression    Clavicle Bone Region Moderate - some protrusion in females, visible in males    Acromion Bone Region Moderate - acromion may slightly protrude    Scapular Bone Region Moderate - mild depression, bones may show slightly    Dorsal Hand Region Moderate - slight depression    Patellar Region Moderate - patella more prominent, less muscle definition around patella    Anterior Thigh Region Moderate - mild depression on inner thigh    Posterior Calf Region Moderate - some roundness, slight firmness      Row Name 08/02/23 1427       Fat Loss    Subcutaneous Fat Loss Findings Moderate    Orbital Region  Moderate -  somewhat hollowness, slightly dark circles    Upper Arm Region Moderate - some fat tissue, not ample    Thoracic & Lumbar Region Moderate - ribs visible with mild depressions, iliac crest somewhat prominent      Row Name 08/02/23 1427       Criteria Met (Must meet criteria for severity in at least 2 of these categories: M Wasting, Fat Loss, Fluid, Secondary Signs, Wt. Status, Intake)    Patient meets criteria for  Severe Malnutrition                       Malnutrition Severity Assessment      Patient meets criteria for : Severe  Malnutrition  Malnutrition Type (last 8 hours)       Malnutrition Severity Assessment       Row Name 08/02/23 1427       Malnutrition Severity Assessment    Malnutrition Type Chronic Disease - Related Malnutrition      Row Name 08/02/23 1427       Insufficient Energy Intake     Insufficient Energy Intake Findings Severe    Insufficient Energy Intake  <75% of est. energy requirement for >7d)      Row Name 08/02/23 1427       Unintentional Weight Loss     Unintentional Weight Loss Findings Severe    Unintentional Weight Loss  Weight loss greater than 7.5% in three months      Row Name 08/02/23 1427       Muscle Loss    Loss of Muscle Mass Findings Moderate    [place] Region Moderate - slight depression    Clavicle Bone Region Moderate - some protrusion in females, visible in males    Acromion Bone Region Moderate - acromion may slightly protrude    Scapular Bone Region Moderate - mild depression, bones may show slightly    Dorsal Hand Region Moderate - slight depression    Patellar Region Moderate - patella more prominent, less muscle definition around patella    Anterior Thigh Region Moderate - mild depression on inner thigh    Posterior Calf Region Moderate - some roundness, slight firmness      Row Name 08/02/23 1427       Fat Loss    Subcutaneous Fat Loss Findings Moderate    Orbital Region  Moderate -  somewhat hollowness, slightly dark circles    Upper Arm Region Moderate - some fat tissue, not ample    Thoracic & Lumbar Region Moderate - ribs visible with mild depressions, iliac crest somewhat prominent      Row Name 08/02/23 1427       Criteria Met (Must meet criteria for severity in at least 2 of these categories: M Wasting, Fat Loss, Fluid, Secondary Signs, Wt. Status, Intake)    Patient meets criteria for  Severe Malnutrition
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
## 2023-08-02 NOTE — PROGRESS NOTES
[facility] Physician - Brief Progress Note  PERMANENT  08/02/2023 02:59    [facility] - [facility] [place], [place] ([facility])    KELLY ESCOBAR    Date of Service 08/02/2023 02:59    HPI/Events of Note [facility] Provider Assessment Note  37 yo male w/hx of alcohol use presents with the goal of alcohol detox.  Pt drinks 10 shots of liquor at work and more at night.  CIWA 3 currently.  No hx of seizures.    T  97.8  HR 81  /84  RR 15  O2 100%    Lactic acid 3.3->2.5  WBC 2.66  UDS negative   Ethanol 574    Assessment and Plan:  Alcohol withdrawal- Continue CIWA protocol.  Ativan ordered.  Seizure precautions.  Lactic acidosis- Due to alcohol use?  Decreasing.  Hydrate.    __y___   Video Assessment performed  __y___   Most recent labs reviewed  ___y__   Vital Signs reviewed  ___y__   Best Practices addressed:                 VTE prophylaxis: SCD's                 SUP (when indicated): NA                 Current Glucose: 134                      Please notify bedside physician when present or [facility] if glc > 180 X 2                 Sepsis guidelines: NA                 Lung protective strategy: NA                 Targeted Temperature Management: NA    __y___     Spoke with bedside RN  __n___     Orders written      Contact [facility] for any needs if bedside physician is not present.      Interventions Intermediate-Diagnostic test evaluation        Electronically Signed by: Teresa Tompkins) on 08/02/2023 03:11

## 2023-08-03 ENCOUNTER — HOSPITAL ENCOUNTER (INPATIENT)
Facility: HOSPITAL | Age: 38
LOS: 5 days | Discharge: HOME | DRG: 897 | End: 2023-08-08
Attending: PSYCHIATRY & NEUROLOGY | Admitting: PSYCHIATRY & NEUROLOGY
Payer: COMMERCIAL

## 2023-08-03 VITALS
DIASTOLIC BLOOD PRESSURE: 89 MMHG | WEIGHT: 119.05 LBS | HEIGHT: 67 IN | RESPIRATION RATE: 12 BRPM | BODY MASS INDEX: 18.69 KG/M2 | HEART RATE: 81 BPM | OXYGEN SATURATION: 96 % | TEMPERATURE: 97.7 F | SYSTOLIC BLOOD PRESSURE: 132 MMHG

## 2023-08-03 PROBLEM — F10.10 ALCOHOL ABUSE: Status: ACTIVE | Noted: 2023-08-03

## 2023-08-03 LAB
ALBUMIN SERPL-MCNC: 4 G/DL (ref 3.5–5.2)
ANION GAP SERPL CALCULATED.3IONS-SCNC: 14 MMOL/L (ref 5–15)
BUN SERPL-MCNC: 7 MG/DL (ref 6–20)
BUN/CREAT SERPL: 15.6 (ref 7–25)
CALCIUM SPEC-SCNC: 8.7 MG/DL (ref 8.6–10.5)
CHLORIDE SERPL-SCNC: 93 MMOL/L (ref 98–107)
CO2 SERPL-SCNC: 24 MMOL/L (ref 22–29)
CREAT SERPL-MCNC: 0.45 MG/DL (ref 0.76–1.27)
DEPRECATED RDW RBC AUTO: 47.8 FL (ref 37–54)
EGFRCR SERPLBLD CKD-EPI 2021: 137.4 ML/MIN/1.73
ERYTHROCYTE [DISTWIDTH] IN BLOOD BY AUTOMATED COUNT: 13.2 % (ref 12.3–15.4)
GLUCOSE SERPL-MCNC: 86 MG/DL (ref 65–99)
HCT VFR BLD AUTO: 35.7 % (ref 37.5–51)
HGB BLD-MCNC: 12.1 G/DL (ref 13–17.7)
MAGNESIUM SERPL-MCNC: 1.7 MG/DL (ref 1.6–2.6)
MCH RBC QN AUTO: 33.2 PG (ref 26.6–33)
MCHC RBC AUTO-ENTMCNC: 33.9 G/DL (ref 31.5–35.7)
MCV RBC AUTO: 98.1 FL (ref 79–97)
PHOSPHATE SERPL-MCNC: 2.1 MG/DL (ref 2.5–4.5)
PHOSPHATE SERPL-MCNC: 2.9 MG/DL (ref 2.5–4.5)
PLATELET # BLD AUTO: 89 10*3/MM3 (ref 140–450)
PMV BLD AUTO: 11.3 FL (ref 6–12)
POTASSIUM SERPL-SCNC: 4.1 MMOL/L (ref 3.5–5.2)
QT INTERVAL: 400 MS
QTC INTERVAL: 467 MS
RBC # BLD AUTO: 3.64 10*6/MM3 (ref 4.14–5.8)
SODIUM SERPL-SCNC: 131 MMOL/L (ref 136–145)
WBC NRBC COR # BLD: 2.69 10*3/MM3 (ref 3.4–10.8)

## 2023-08-03 PROCEDURE — 25010000002 LORAZEPAM PER 2 MG: Performed by: HOSPITALIST

## 2023-08-03 PROCEDURE — 84100 ASSAY OF PHOSPHORUS: CPT | Performed by: HOSPITALIST

## 2023-08-03 PROCEDURE — 94799 UNLISTED PULMONARY SVC/PX: CPT

## 2023-08-03 PROCEDURE — 93010 ELECTROCARDIOGRAM REPORT: CPT | Performed by: SPECIALIST

## 2023-08-03 PROCEDURE — 83735 ASSAY OF MAGNESIUM: CPT | Performed by: HOSPITALIST

## 2023-08-03 PROCEDURE — 93005 ELECTROCARDIOGRAM TRACING: CPT | Performed by: PSYCHIATRY & NEUROLOGY

## 2023-08-03 PROCEDURE — 85027 COMPLETE CBC AUTOMATED: CPT | Performed by: STUDENT IN AN ORGANIZED HEALTH CARE EDUCATION/TRAINING PROGRAM

## 2023-08-03 PROCEDURE — 80069 RENAL FUNCTION PANEL: CPT | Performed by: STUDENT IN AN ORGANIZED HEALTH CARE EDUCATION/TRAINING PROGRAM

## 2023-08-03 PROCEDURE — 99232 SBSQ HOSP IP/OBS MODERATE 35: CPT | Performed by: PSYCHIATRY & NEUROLOGY

## 2023-08-03 PROCEDURE — 94761 N-INVAS EAR/PLS OXIMETRY MLT: CPT

## 2023-08-03 RX ORDER — BENZTROPINE MESYLATE 1 MG/ML
1 INJECTION INTRAMUSCULAR; INTRAVENOUS ONCE AS NEEDED
Status: DISCONTINUED | OUTPATIENT
Start: 2023-08-03 | End: 2023-08-08 | Stop reason: HOSPADM

## 2023-08-03 RX ORDER — LOPERAMIDE HYDROCHLORIDE 2 MG/1
2 CAPSULE ORAL
Status: DISCONTINUED | OUTPATIENT
Start: 2023-08-03 | End: 2023-08-08 | Stop reason: HOSPADM

## 2023-08-03 RX ORDER — LORAZEPAM 0.5 MG/1
0.5 TABLET ORAL
Status: COMPLETED | OUTPATIENT
Start: 2023-08-07 | End: 2023-08-07

## 2023-08-03 RX ORDER — LORAZEPAM 2 MG/1
2 TABLET ORAL EVERY 4 HOURS PRN
Status: CANCELLED | OUTPATIENT
Start: 2023-08-04 | End: 2023-08-05

## 2023-08-03 RX ORDER — ALUMINA, MAGNESIA, AND SIMETHICONE 2400; 2400; 240 MG/30ML; MG/30ML; MG/30ML
15 SUSPENSION ORAL EVERY 6 HOURS PRN
Status: DISCONTINUED | OUTPATIENT
Start: 2023-08-03 | End: 2023-08-08 | Stop reason: HOSPADM

## 2023-08-03 RX ORDER — NICOTINE 21 MG/24HR
1 PATCH, TRANSDERMAL 24 HOURS TRANSDERMAL
Status: DISCONTINUED | OUTPATIENT
Start: 2023-08-03 | End: 2023-08-08 | Stop reason: HOSPADM

## 2023-08-03 RX ORDER — HYDROXYZINE 50 MG/1
50 TABLET, FILM COATED ORAL EVERY 6 HOURS PRN
Status: DISCONTINUED | OUTPATIENT
Start: 2023-08-03 | End: 2023-08-03

## 2023-08-03 RX ORDER — LORAZEPAM 2 MG/1
2 TABLET ORAL
Status: CANCELLED | OUTPATIENT
Start: 2023-08-04 | End: 2023-08-05

## 2023-08-03 RX ORDER — LORAZEPAM 2 MG/1
2 TABLET ORAL
Status: DISPENSED | OUTPATIENT
Start: 2023-08-04 | End: 2023-08-05

## 2023-08-03 RX ORDER — LORAZEPAM 1 MG/1
1 TABLET ORAL EVERY 4 HOURS PRN
Status: ACTIVE | OUTPATIENT
Start: 2023-08-06 | End: 2023-08-07

## 2023-08-03 RX ORDER — LORAZEPAM 2 MG/1
2 TABLET ORAL
Status: DISPENSED | OUTPATIENT
Start: 2023-08-03 | End: 2023-08-04

## 2023-08-03 RX ORDER — FAMOTIDINE 20 MG/1
20 TABLET, FILM COATED ORAL 2 TIMES DAILY PRN
Status: DISCONTINUED | OUTPATIENT
Start: 2023-08-03 | End: 2023-08-08 | Stop reason: HOSPADM

## 2023-08-03 RX ORDER — LORAZEPAM 0.5 MG/1
0.5 TABLET ORAL EVERY 4 HOURS PRN
Status: CANCELLED | OUTPATIENT
Start: 2023-08-07 | End: 2023-08-08

## 2023-08-03 RX ORDER — LORAZEPAM 0.5 MG/1
0.5 TABLET ORAL EVERY 4 HOURS PRN
Status: ACTIVE | OUTPATIENT
Start: 2023-08-07 | End: 2023-08-08

## 2023-08-03 RX ORDER — LORAZEPAM 1 MG/1
1 TABLET ORAL
Status: CANCELLED | OUTPATIENT
Start: 2023-08-06 | End: 2023-08-07

## 2023-08-03 RX ORDER — LORAZEPAM 2 MG/1
2 TABLET ORAL
Status: CANCELLED | OUTPATIENT
Start: 2023-08-03 | End: 2023-08-04

## 2023-08-03 RX ORDER — SODIUM PHOSPHATE IN 0.9 % NACL 15MMOL/100
15 PLASTIC BAG, INJECTION (ML) INTRAVENOUS ONCE
Status: COMPLETED | OUTPATIENT
Start: 2023-08-03 | End: 2023-08-03

## 2023-08-03 RX ORDER — ECHINACEA PURPUREA EXTRACT 125 MG
2 TABLET ORAL AS NEEDED
Status: DISCONTINUED | OUTPATIENT
Start: 2023-08-03 | End: 2023-08-08 | Stop reason: HOSPADM

## 2023-08-03 RX ORDER — LORAZEPAM 2 MG/1
2 TABLET ORAL EVERY 4 HOURS PRN
Status: DISPENSED | OUTPATIENT
Start: 2023-08-04 | End: 2023-08-05

## 2023-08-03 RX ORDER — LORAZEPAM 1 MG/1
1 TABLET ORAL
Status: COMPLETED | OUTPATIENT
Start: 2023-08-06 | End: 2023-08-06

## 2023-08-03 RX ORDER — TRAZODONE HYDROCHLORIDE 50 MG/1
50 TABLET ORAL NIGHTLY PRN
Status: DISCONTINUED | OUTPATIENT
Start: 2023-08-03 | End: 2023-08-03

## 2023-08-03 RX ORDER — BENZTROPINE MESYLATE 1 MG/1
2 TABLET ORAL ONCE AS NEEDED
Status: DISCONTINUED | OUTPATIENT
Start: 2023-08-03 | End: 2023-08-08 | Stop reason: HOSPADM

## 2023-08-03 RX ORDER — LORAZEPAM 1 MG/1
1 TABLET ORAL EVERY 4 HOURS PRN
Status: CANCELLED | OUTPATIENT
Start: 2023-08-06 | End: 2023-08-07

## 2023-08-03 RX ORDER — LEVETIRACETAM 500 MG/1
500 TABLET ORAL EVERY 12 HOURS SCHEDULED
Status: DISCONTINUED | OUTPATIENT
Start: 2023-08-03 | End: 2023-08-08 | Stop reason: HOSPADM

## 2023-08-03 RX ORDER — ACETAMINOPHEN 325 MG/1
650 TABLET ORAL EVERY 6 HOURS PRN
Status: DISCONTINUED | OUTPATIENT
Start: 2023-08-03 | End: 2023-08-08 | Stop reason: HOSPADM

## 2023-08-03 RX ORDER — BENZONATATE 100 MG/1
100 CAPSULE ORAL 3 TIMES DAILY PRN
Status: DISCONTINUED | OUTPATIENT
Start: 2023-08-03 | End: 2023-08-08 | Stop reason: HOSPADM

## 2023-08-03 RX ORDER — ONDANSETRON 4 MG/1
4 TABLET, FILM COATED ORAL EVERY 6 HOURS PRN
Status: DISCONTINUED | OUTPATIENT
Start: 2023-08-03 | End: 2023-08-03

## 2023-08-03 RX ORDER — LORAZEPAM 0.5 MG/1
0.5 TABLET ORAL
Status: CANCELLED | OUTPATIENT
Start: 2023-08-07 | End: 2023-08-08

## 2023-08-03 RX ORDER — ESCITALOPRAM OXALATE 10 MG/1
10 TABLET ORAL DAILY
Status: DISCONTINUED | OUTPATIENT
Start: 2023-08-03 | End: 2023-08-08 | Stop reason: HOSPADM

## 2023-08-03 RX ORDER — IBUPROFEN 400 MG/1
400 TABLET ORAL EVERY 6 HOURS PRN
Status: DISCONTINUED | OUTPATIENT
Start: 2023-08-03 | End: 2023-08-08 | Stop reason: HOSPADM

## 2023-08-03 RX ADMIN — LEVETIRACETAM 500 MG: 500 TABLET, FILM COATED ORAL at 08:45

## 2023-08-03 RX ADMIN — Medication 100 MG: at 16:01

## 2023-08-03 RX ADMIN — SODIUM PHOSPHATE, MONOBASIC, MONOHYDRATE AND SODIUM PHOSPHATE, DIBASIC, ANHYDROUS 15 MMOL: 276; 142 INJECTION, SOLUTION INTRAVENOUS at 06:31

## 2023-08-03 RX ADMIN — LORAZEPAM 1 MG: 1 TABLET ORAL at 14:22

## 2023-08-03 RX ADMIN — LORAZEPAM 2 MG: 2 TABLET ORAL at 16:01

## 2023-08-03 RX ADMIN — LORAZEPAM 1 MG: 1 TABLET ORAL at 07:37

## 2023-08-03 RX ADMIN — IBUPROFEN 400 MG: 400 TABLET, FILM COATED ORAL at 17:15

## 2023-08-03 RX ADMIN — LEVETIRACETAM 500 MG: 500 TABLET, FILM COATED ORAL at 20:15

## 2023-08-03 RX ADMIN — LORAZEPAM 2 MG: 2 INJECTION INTRAMUSCULAR; INTRAVENOUS at 02:41

## 2023-08-03 RX ADMIN — NICOTINE TRANSDERMAL SYSTEM 1 PATCH: 21 PATCH, EXTENDED RELEASE TRANSDERMAL at 20:17

## 2023-08-03 RX ADMIN — LORAZEPAM 2 MG: 2 TABLET ORAL at 23:38

## 2023-08-03 RX ADMIN — FAMOTIDINE 20 MG: 20 TABLET, FILM COATED ORAL at 22:42

## 2023-08-03 RX ADMIN — ESCITALOPRAM 10 MG: 10 TABLET, FILM COATED ORAL at 08:45

## 2023-08-03 RX ADMIN — LORAZEPAM 2 MG: 2 TABLET ORAL at 19:27

## 2023-08-03 RX ADMIN — Medication 1 TABLET: at 08:45

## 2023-08-03 RX ADMIN — LORAZEPAM 2 MG: 2 TABLET ORAL at 21:36

## 2023-08-03 RX ADMIN — ESCITALOPRAM 10 MG: 10 TABLET, FILM COATED ORAL at 16:00

## 2023-08-03 RX ADMIN — Medication 10 ML: at 08:45

## 2023-08-03 RX ADMIN — NICOTINE TRANSDERMAL SYSTEM 1 PATCH: 21 PATCH, EXTENDED RELEASE TRANSDERMAL at 02:40

## 2023-08-03 NOTE — PLAN OF CARE
Problem: Adult Behavioral Health Plan of Care  Goal: Plan of Care Review  Outcome: Ongoing, Progressing  Flowsheets (Taken 8/3/2023 1630)  Progress: no change  Plan of Care Reviewed With: patient  Patient Agreement with Plan of Care: agrees  Outcome Evaluation: Direct Admit from CCU   Goal Outcome Evaluation:  Plan of Care Reviewed With: patient  Patient Agreement with Plan of Care: agrees     Progress: no change  Outcome Evaluation: Direct Admit from CCU

## 2023-08-03 NOTE — NURSING NOTE
Spoke with Dr Mota at this time. Advised  to admit patient to Detox-   Ativan detox admission day; Repeat CMP on 8/6.  Keppra 500 mg bid; Lexapro 10 mg qd.RBVX2.

## 2023-08-03 NOTE — NURSING NOTE
Patient arrived to unit. Very unsteady. Require x 1 assist for ambulation r/t fall risk.  Dr Mota notified New order for Sitter, Fall precaution. RBVX2.    Patient updated on plan of care    Lima Truong Rn made aware

## 2023-08-03 NOTE — PLAN OF CARE
Problem: Fall Injury Risk  Goal: Absence of Fall and Fall-Related Injury  Outcome: Ongoing, Not Progressing  Intervention: Identify and Manage Contributors  Recent Flowsheet Documentation  Taken 8/3/2023 0400 by Yasmine Sarabia RN  Medication Review/Management: medications reviewed  Taken 8/3/2023 0200 by Yasmine Sarabia RN  Medication Review/Management: medications reviewed  Taken 8/3/2023 0000 by Yasmine Sarabia RN  Medication Review/Management: medications reviewed  Taken 8/2/2023 2200 by Yasmine Sarabia RN  Medication Review/Management: medications reviewed  Taken 8/2/2023 2000 by Yasmine Sarabia RN  Medication Review/Management: medications reviewed  Intervention: Promote Injury-Free Environment  Recent Flowsheet Documentation  Taken 8/3/2023 0400 by Yasmine Sarabia RN  Safety Promotion/Fall Prevention:   fall prevention program maintained   safety round/check completed  Taken 8/3/2023 0300 by Yasmine Sarabai RN  Safety Promotion/Fall Prevention:   fall prevention program maintained   safety round/check completed  Taken 8/3/2023 0200 by Yasmine Sarabia RN  Safety Promotion/Fall Prevention:   fall prevention program maintained   safety round/check completed  Taken 8/3/2023 0100 by Yasmine Sarabia RN  Safety Promotion/Fall Prevention:   fall prevention program maintained   toileting scheduled  Taken 8/3/2023 0000 by Yasmine Sarabia RN  Safety Promotion/Fall Prevention:   fall prevention program maintained   safety round/check completed  Taken 8/2/2023 2300 by Yasmine Sarabia RN  Safety Promotion/Fall Prevention:   fall prevention program maintained   safety round/check completed  Taken 8/2/2023 2200 by Yasmine Sarabia RN  Safety Promotion/Fall Prevention:   fall prevention program maintained   safety round/check completed  Taken 8/2/2023 2100 by Yasmine Sarabia RN  Safety Promotion/Fall Prevention:   fall prevention program maintained   safety round/check completed  Taken 8/2/2023 2000  by Yasmine Sarabia, RN  Safety Promotion/Fall Prevention:   fall prevention program maintained   safety round/check completed  Taken 8/2/2023 1900 by Yasmine Sarabia, RN  Safety Promotion/Fall Prevention:   fall prevention program maintained   safety round/check completed   Goal Outcome Evaluation:
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
## 2023-08-03 NOTE — PROGRESS NOTES
"INPATIENT PSYCHIATRIC PROGRESS NOTE    Name:  Sachin Limon  :  1985  MRN:  [id number]  Visit Number:  [id number]  Length of stay:  2    SUBJECTIVE    CC/Focus of Exam: Alcohol abuse    INTERVAL HISTORY:  Patient with increased tremor, anxiety, insomnia.  No seizures, vitals stable.  Ambulating and eating appropriately.  Appropriate for transfer to detox today.    Review of Systems   Constitutional:  Positive for diaphoresis.   Respiratory: Negative.     Cardiovascular: Negative.    Gastrointestinal:  Positive for nausea.   Musculoskeletal:  Positive for myalgias.   Neurological:  Positive for tremors and weakness.   Psychiatric/Behavioral:  Positive for sleep disturbance. The patient is nervous/anxious.      OBJECTIVE    Temp:  [97.7 øF (36.5 øC)-99.5 øF (37.5 øC)] 98.3 øF (36.8 øC)  Heart Rate:  [] 73  Resp:  [10-20] 10  BP: (127-154)/(73-99) 133/76    MENTAL STATUS EXAM:  Appearance: Casually dressed, fair hygeine.   Cooperation: Cooperative  Psychomotor: No psychomotor agitation/retardation, No EPS, No motor tics  Speech: normal rate, amount.  Mood: \"uncomfortable\"   Affect: congruent, restricted, anxious  Thought Content: goal directed, no delusional material present  Thought process: linear, organized.  Suicidality: No SI  Homicidality: No HI  Perception: No AH/VH  Insight: fair   Judgment: fair    Lab Results (last 24 hours)       Procedure Component Value Units Date/Time    Magnesium [[id number]]  (Normal) Collected: 23    Specimen: Blood Updated: 23     Magnesium 1.7 mg/dL     Renal Function Panel [[id number]]  (Abnormal) Collected: 23    Specimen: Blood Updated: 23     Glucose 86 mg/dL      BUN 7 mg/dL      Creatinine 0.45 mg/dL      Sodium 131 mmol/L      Potassium 4.1 mmol/L      Chloride 93 mmol/L      CO2 24.0 mmol/L      Calcium 8.7 mg/dL      Albumin 4.0 g/dL      Phosphorus 2.1 mg/dL      Anion Gap 14.0 mmol/L      BUN/Creatinine Ratio 15.6    "  eGFR 137.4 mL/min/1.73     Narrative:      GFR Normal >60  Chronic Kidney Disease <60  Kidney Failure <15      CBC (No Diff) [[id number]]  (Abnormal) Collected: 08/03/23 0012    Specimen: Blood Updated: 08/03/23 0037     WBC 2.69 10*3/mm3      RBC 3.64 10*6/mm3      Hemoglobin 12.1 g/dL      Hematocrit 35.7 %      MCV 98.1 fL      MCH 33.2 pg      MCHC 33.9 g/dL      RDW 13.2 %      RDW-SD 47.8 fl      MPV 11.3 fL      Platelets 89 10*3/mm3                Imaging Results (Last 24 Hours)       ** No results found for the last 24 hours. **               ECG/EMG Results (most recent)       Procedure Component Value Units Date/Time    ECG 12 Lead Other; baseline for admission, check QTc [[id number]] Collected: 08/01/23 2336     Updated: 08/01/23 2337     QT Interval 400 ms      QTC Interval 467 ms     Narrative:      Test Reason : Other~  Blood Pressure :   */*   mmHG  Vent. Rate :  82 BPM     Atrial Rate :  82 BPM     P-R Int : 142 ms          QRS Dur :  84 ms      QT Int : 400 ms       P-R-T Axes :  61  63  80 degrees     QTc Int : 467 ms    Normal sinus rhythm  Normal ECG  No previous ECGs available    Referred By: ALEX           Confirmed By:     SCANNED - TELEMETRY   [[id number]] Resulted: 08/01/23     Updated: 08/02/23 0716    SCANNED - TELEMETRY   [[id number]] Resulted: 08/01/23     Updated: 08/02/23 0716    SCANNED - TELEMETRY   [[id number]] Resulted: 08/01/23     Updated: 08/03/23 0453    SCANNED - TELEMETRY   [[id number]] Resulted: 08/01/23     Updated: 08/03/23 0531             ALLERGIES: Zoloft [sertraline]      Current Facility-Administered Medications:     albuterol (PROVENTIL) nebulizer solution 0.083% 2.5 mg/3mL, 2.5 mg, Nebulization, Q4H PRN, Silvano Gonzales MD    sennosides-docusate (PERICOLACE) 8.6-50 MG per tablet 2 tablet, 2 tablet, Oral, BID **AND** polyethylene glycol (MIRALAX) packet 17 g, 17 g, Oral, Daily PRN **AND** bisacodyl (DULCOLAX) EC tablet 5 mg, 5 mg, Oral, Daily PRN **AND** bisacodyl  (DULCOLAX) suppository 10 mg, 10 mg, Rectal, Daily PRN, Jose Mora MD    busPIRone (BUSPAR) tablet 15 mg, 15 mg, Oral, TID PRN, Silvano Gonzales MD    escitalopram (LEXAPRO) tablet 10 mg, 10 mg, Oral, Daily, Silvano Gonzales MD, 10 mg at 08/03/23 0845    levETIRAcetam (KEPPRA) tablet 500 mg, 500 mg, Oral, BID, Silvano Gonzales MD, 500 mg at 08/03/23 0845    LORazepam (ATIVAN) tablet 1 mg, 1 mg, Oral, Q1H PRN, 1 mg at 08/03/23 0737 **OR** LORazepam (ATIVAN) injection 1 mg, 1 mg, Intravenous, Q1H PRN, 1 mg at 08/01/23 2109 **OR** LORazepam (ATIVAN) tablet 2 mg, 2 mg, Oral, Q1H PRN **OR** LORazepam (ATIVAN) injection 2 mg, 2 mg, Intravenous, Q1H PRN, 2 mg at 08/03/23 0241 **OR** LORazepam (ATIVAN) injection 2 mg, 2 mg, Intravenous, Q15 Min PRN **OR** LORazepam (ATIVAN) injection 2 mg, 2 mg, Intramuscular, Q15 Min PRN **OR** LORazepam (ATIVAN) tablet 4 mg, 4 mg, Oral, Q1H PRN **OR** LORazepam (ATIVAN) injection 4 mg, 4 mg, Intravenous, Q1H PRN, Jose Mora MD    Magnesium Standard Dose Replacement - Follow Nurse / BPA Driven Protocol, , Does not apply, PRN, Jose Mora MD    multivitamin (THERAGRAN) tablet 1 tablet, 1 tablet, Oral, Daily, Silvano Gonzales MD, 1 tablet at 08/03/23 0845    nicotine (NICODERM CQ) 21 MG/24HR patch 1 patch, 1 patch, Transdermal, Q24H, Jose Mora MD, 1 patch at 08/03/23 0240    nitroglycerin (NITROSTAT) SL tablet 0.4 mg, 0.4 mg, Sublingual, Q5 Min PRN, Abby, Jose Issa, MD    Phosphorus Replacement - Follow Nurse / BPA Driven Protocol, , Does not apply, Abby CISSE Johnny Bruce, MD    sodium chloride 0.9 % flush 10 mL, 10 mL, Intravenous, PRN, Jose Mora MD    sodium chloride 0.9 % flush 10 mL, 10 mL, Intravenous, Q12H, Jose Mora MD, 10 mL at 08/03/23 0845    sodium chloride 0.9 % flush 10 mL, 10 mL, Intravenous, PRN, Jose Mora MD    sodium chloride 0.9 % infusion 40 mL, 40 mL, Intravenous, PRN,  Jose Mora MD    thiamine (B-1) 100 mg, folic acid 1 mg in sodium chloride 0.9 % 1,000 mL infusion, 100 mL/hr, Intravenous, Nightly, Jose Mora MD, Stopped at 08/03/23 0850    traZODone (DESYREL) tablet 50 mg, 50 mg, Oral, Nightly PRN, Silvano Gonzales MD    Reviewed chart, notes, vitals, labs and EKG personally reviewed.    ASSESSMENT & PLAN:      Alcohol use disorder, severe, dependence, in withdrawal  -Patient appropriate for transfer to the detox unit at this time     Unspecified depressive disorder  -Continue escitalopram     Seizure disorder  -Continue Keppra       Clinician:  Dallas Mota MD  08/03/23  12:12 EDT

## 2023-08-03 NOTE — DISCHARGE SUMMARY
[facility] HOSPITALISTS DISCHARGE SUMMARY    Patient Identification:  Name:  Sachin Limon  Age:  38 y.o.  Sex:  male  :  1985  MRN:  [id number]  Visit Number:  [id number]    Date of Admission: 2023  Date of Discharge: 23     PCP: Provider, No Known    DISCHARGE DIAGNOSES  #Acute alcohol intoxication w/withdrawal requiring medical detox  #Hx of sz/Dts  #Pancytopenia secondary to alcohol intake  #Acute mild alcoholic hepatitis, improving  #Hypomagnesemia  #MDD    CONSULTS   Consults       Date and Time Order Name Status Description    2023  8:16 AM Inpatient Psychiatrist Consult Completed             PROCEDURES PERFORMED  -    HOSPITAL COURSE  Mr. Limon is a 38 yoM that presented initially via private vehicle for medical detox. Patient resides around [facility] but was brought to [facility] by a friend as he stated he had been banned from some of the local detox facilities near his home. Reports prior sobriety for ~130 days but over last 2 weeks has been binge drinking daily remaining intoxicated for majority of awake hours. Ethanol level on arrival was 574 and patient did endorse hx of Dts and seizures thus he was admitted to ICU for medical assisted detox. His CIWA scores peaked at 13 and overall sx were well controlled with prn ativan CIWA protocol on sx driven protocol alone, no gil benzos needed. Patient also treated with high dose B-vitamins and should cont PO b-vitamin daily supplement following discharge. CIWA scores declining and patient clinically stable appearing with mild tremor and diaphoresis without VS changes or concern for Dts currently thus he was seen by psychiatry, now that >48hrs from admission patient was determined to be appropriate for transfer to [facility] for ongoing detox.     Patient also noted to have mild LFT elevation that improved with alcohol cessation and IVF, can repeat LFTs as outpatient to document return to normal range.  No RUQ pathology noted on imaging.    Pancytopenia secondary to alcohol abuse and malnutrition noted this admission, would rec repeat CBC w/diff and CMP within 3-4 days from now to document improvement. If any concerns please reach back out to hospitalist service for consultation.    Patient was given generous IVF hydration on admission, sodium was noted to have declined acutely with LR however IVF stopped prior to transfer and patient w/adequate PO hydration alone. Sodium should autocorrect with acute mild hyponatremia secondary to IVF.    Referral placed for pcp at time of discharge as well to f/u on above as outpatient.          VITAL SIGNS:  Temp:  [97.7 øF (36.5 øC)-99.5 øF (37.5 øC)] 97.7 øF (36.5 øC)  Heart Rate:  [59-93] 73  Resp:  [10-20] 10  BP: (127-154)/(73-99) 133/76  SpO2:  [93 %-99 %] 98 %  on  Flow (L/min):  [2] 2;   Device (Oxygen Therapy): room air    Body mass index is 18.65 kg/mý.  Wt Readings from Last 3 Encounters:   08/02/23 54 kg (119 lb 0.8 oz)       PHYSICAL EXAM:  Physical Exam  Vitals and nursing note reviewed.   Constitutional:       General: He is not in acute distress.     Appearance: He is diaphoretic.   HENT:      Head: Normocephalic and atraumatic.      Mouth/Throat:      Mouth: Mucous membranes are dry.      Pharynx: Oropharynx is clear.   Eyes:      General: No scleral icterus.     Pupils: Pupils are equal, round, and reactive to light.   Cardiovascular:      Pulses: Normal pulses.   Pulmonary:      Effort: Pulmonary effort is normal. No respiratory distress.   Abdominal:      General: Abdomen is flat.      Palpations: Abdomen is soft.   Musculoskeletal:      Right lower leg: No edema.      Left lower leg: No edema.   Skin:     General: Skin is warm.      Coloration: Skin is not jaundiced or pale.   Neurological:      Mental Status: He is alert and oriented to person, place, and time.      Cranial Nerves: No cranial nerve deficit.      Comments: Mild resting tremor, no true  atserixis   Psychiatric:         Mood and Affect: Mood normal.         Behavior: Behavior normal.        DISCHARGE DISPOSITION   Stable       Discharge Medications        Continue These Medications        Instructions Start Date   albuterol sulfate  (90 Base) MCG/ACT inhaler  Commonly known as: PROVENTIL HFA;VENTOLIN HFA;PROAIR HFA   2 puffs, Inhalation, Every 4 Hours PRN      busPIRone 15 MG tablet  Commonly known as: BUSPAR   15 mg, Oral, 3 Times Daily PRN      escitalopram 10 MG tablet  Commonly known as: LEXAPRO   10 mg, Oral, Daily      ibuprofen 800 MG tablet  Commonly known as: ADVIL,MOTRIN   800 mg, Oral, Every 8 Hours PRN      levETIRAcetam 500 MG tablet  Commonly known as: KEPPRA   500 mg, Oral, 2 Times Daily      multivitamin tablet tablet   1 tablet, Oral, Daily      thiamine 100 MG tablet  tablet  Commonly known as: VITAMIN B-1   100 mg, Oral, Daily      traZODone 50 MG tablet  Commonly known as: DESYREL    mg, Oral, Nightly PRN                 Additional Instructions for the Follow-ups that You Need to Schedule       Discharge Follow-up with PCP   As directed       Currently Documented PCP:    Provider, No Known    PCP Phone Number:    [phone]     Follow Up Details: PCP to establish care near [facility] after d/c, soonest available               Follow-up Information       Provider, No Known .    Why: PCP to establish care near [facility] after d/c, soonest available  Contact information:  [facility] [address]  [phone]                              TEST  RESULTS PENDING AT DISCHARGE  Pending Labs       Order Current Status    Phosphorus Collected (08/03/23 1258)    Levetiracetam Level (Keppra) In process            I will notify patient via phone-call should above lab work result with any significant abnormal findings     CODE STATUS  Code Status and Medical Interventions:   Ordered at: 08/01/23 3359     Level Of Support Discussed With:    Patient      Code Status (Patient has no pulse and is not breathing):    CPR (Attempt to Resuscitate)     Medical Interventions (Patient has pulse or is breathing):    Full Support     Release to patient:    Routine Release       The ASCVD Risk score (Bibi CRUZ, et al., 2019) failed to calculate for the following reasons:    The 2019 ASCVD risk score is only valid for ages 40 to 79       Silvano Gonzales MD  [facility]  08/03/23  13:22 EDT    Please note that this discharge summary required more than 30 minutes to complete.

## 2023-08-03 NOTE — CASE MANAGEMENT/SOCIAL WORK
Discharge Planning Assessment   [place]     Patient Name: Sachin Limon  MRN: [id number]  Today's Date: 8/3/2023    Admit Date: 8/1/2023            Discharge Plan       Row Name 08/03/23 1340       Plan    Final Discharge Disposition Code 65 - psychiatric hospital or unit    Final Note Pt was discharged to  Inpatient Detox Unit on this date 8/3/23.            MARISSA Marcano

## 2023-08-04 PROBLEM — F10.939 ALCOHOL WITHDRAWAL: Status: ACTIVE | Noted: 2023-08-04

## 2023-08-04 PROBLEM — F10.20 ALCOHOL USE DISORDER, SEVERE, DEPENDENCE: Status: ACTIVE | Noted: 2023-08-03

## 2023-08-04 PROBLEM — F32.A DEPRESSION: Status: ACTIVE | Noted: 2023-08-04

## 2023-08-04 PROBLEM — G40.909 SEIZURE DISORDER: Status: ACTIVE | Noted: 2023-08-04

## 2023-08-04 LAB
LEVETIRACETAM SERPL-MCNC: <2 UG/ML (ref 10–40)
QT INTERVAL: 442 MS
QTC INTERVAL: 483 MS

## 2023-08-04 PROCEDURE — HZ2ZZZZ DETOXIFICATION SERVICES FOR SUBSTANCE ABUSE TREATMENT: ICD-10-PCS | Performed by: PSYCHIATRY & NEUROLOGY

## 2023-08-04 PROCEDURE — 99223 1ST HOSP IP/OBS HIGH 75: CPT | Performed by: PSYCHIATRY & NEUROLOGY

## 2023-08-04 RX ORDER — CHLORDIAZEPOXIDE HYDROCHLORIDE 25 MG/1
25 CAPSULE, GELATIN COATED ORAL EVERY 8 HOURS SCHEDULED
Status: DISPENSED | OUTPATIENT
Start: 2023-08-04 | End: 2023-08-05

## 2023-08-04 RX ORDER — CHLORDIAZEPOXIDE HYDROCHLORIDE 10 MG/1
10 CAPSULE, GELATIN COATED ORAL 2 TIMES DAILY
Status: COMPLETED | OUTPATIENT
Start: 2023-08-06 | End: 2023-08-06

## 2023-08-04 RX ORDER — CHLORDIAZEPOXIDE HYDROCHLORIDE 25 MG/1
50 CAPSULE, GELATIN COATED ORAL ONCE
Status: COMPLETED | OUTPATIENT
Start: 2023-08-04 | End: 2023-08-04

## 2023-08-04 RX ORDER — CHLORDIAZEPOXIDE HYDROCHLORIDE 25 MG/1
50 CAPSULE, GELATIN COATED ORAL 3 TIMES DAILY PRN
Status: DISPENSED | OUTPATIENT
Start: 2023-08-04 | End: 2023-08-05

## 2023-08-04 RX ORDER — CHLORDIAZEPOXIDE HYDROCHLORIDE 10 MG/1
10 CAPSULE, GELATIN COATED ORAL ONCE
Status: COMPLETED | OUTPATIENT
Start: 2023-08-07 | End: 2023-08-07

## 2023-08-04 RX ORDER — CHLORDIAZEPOXIDE HYDROCHLORIDE 25 MG/1
25 CAPSULE, GELATIN COATED ORAL 2 TIMES DAILY
Status: COMPLETED | OUTPATIENT
Start: 2023-08-05 | End: 2023-08-05

## 2023-08-04 RX ADMIN — ESCITALOPRAM 10 MG: 10 TABLET, FILM COATED ORAL at 08:26

## 2023-08-04 RX ADMIN — LORAZEPAM 2 MG: 2 TABLET ORAL at 01:55

## 2023-08-04 RX ADMIN — LORAZEPAM 2 MG: 2 TABLET ORAL at 09:12

## 2023-08-04 RX ADMIN — LORAZEPAM 2 MG: 2 TABLET ORAL at 07:57

## 2023-08-04 RX ADMIN — CHLORDIAZEPOXIDE HYDROCHLORIDE 50 MG: 25 CAPSULE ORAL at 11:14

## 2023-08-04 RX ADMIN — LEVETIRACETAM 500 MG: 500 TABLET, FILM COATED ORAL at 21:31

## 2023-08-04 RX ADMIN — LORAZEPAM 2 MG: 2 TABLET ORAL at 05:57

## 2023-08-04 RX ADMIN — CHLORDIAZEPOXIDE HYDROCHLORIDE 50 MG: 25 CAPSULE ORAL at 19:25

## 2023-08-04 RX ADMIN — LEVETIRACETAM 500 MG: 500 TABLET, FILM COATED ORAL at 08:26

## 2023-08-04 RX ADMIN — LORAZEPAM 2 MG: 2 TABLET ORAL at 21:31

## 2023-08-04 RX ADMIN — Medication 100 MG: at 08:26

## 2023-08-04 RX ADMIN — LORAZEPAM 2 MG: 2 TABLET ORAL at 04:03
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
## 2023-08-04 NOTE — PLAN OF CARE
Goal Outcome Evaluation:  Plan of Care Reviewed With: patient  Patient Agreement with Plan of Care: agrees      Pt had difficulty falling asleep, excused from  group, and appetite is good.

## 2023-08-04 NOTE — NURSING NOTE
Dr. Wilson contacted related to patient having increased agitation and requesting to be discharged. Pt disoriented to place, situation, and time. /98 Heart rate 88.      Per Dr. Wilson continue with Ativan 2mg Q 2hours  PRN      Curvilinear Excision Additional Text (Leave Blank If You Do Not Want): The margin was drawn around the clinically apparent lesion.  A curvilinear shape was then drawn on the skin incorporating the lesion and margins.  Incisions were then made along these lines to the appropriate tissue plane and the lesion was extirpated.

## 2023-08-04 NOTE — PLAN OF CARE
Goal Outcome Evaluation:  Plan of Care Reviewed With: patient  Patient Agreement with Plan of Care: agrees     Progress: improving  Outcome Evaluation: Patient extremely confused and disoriented during the beginning of the shift. Dr. Sorto ordered librium and patient finally was able to get some rest and has done better after that. He continues to have sitter at bedside for safety.
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
## 2023-08-04 NOTE — NURSING NOTE
Contacted Dr. Sorto to inform him that patient's CIWA was 28. Dr. Sorto ordered one time dose of Librium 50 mg.

## 2023-08-05 LAB
FLUAV RNA RESP QL NAA+PROBE: NOT DETECTED
FLUBV RNA ISLT QL NAA+PROBE: NOT DETECTED
SARS-COV-2 RNA RESP QL NAA+PROBE: NOT DETECTED

## 2023-08-05 PROCEDURE — 87636 SARSCOV2 & INF A&B AMP PRB: CPT | Performed by: STUDENT IN AN ORGANIZED HEALTH CARE EDUCATION/TRAINING PROGRAM

## 2023-08-05 PROCEDURE — 99232 SBSQ HOSP IP/OBS MODERATE 35: CPT | Performed by: PSYCHIATRY & NEUROLOGY

## 2023-08-05 RX ADMIN — LEVETIRACETAM 500 MG: 500 TABLET, FILM COATED ORAL at 21:39

## 2023-08-05 RX ADMIN — CHLORDIAZEPOXIDE HYDROCHLORIDE 25 MG: 25 CAPSULE ORAL at 21:37

## 2023-08-05 RX ADMIN — CHLORDIAZEPOXIDE HYDROCHLORIDE 25 MG: 25 CAPSULE ORAL at 08:35

## 2023-08-05 RX ADMIN — LORAZEPAM 1.5 MG: 1 TABLET ORAL at 08:34

## 2023-08-05 RX ADMIN — LORAZEPAM 1.5 MG: 1 TABLET ORAL at 21:37

## 2023-08-05 RX ADMIN — LEVETIRACETAM 500 MG: 500 TABLET, FILM COATED ORAL at 08:35

## 2023-08-05 RX ADMIN — ESCITALOPRAM 10 MG: 10 TABLET, FILM COATED ORAL at 08:35

## 2023-08-05 RX ADMIN — LORAZEPAM 1.5 MG: 1 TABLET ORAL at 14:24

## 2023-08-05 RX ADMIN — Medication 100 MG: at 08:35

## 2023-08-05 NOTE — PLAN OF CARE
Goal Outcome Evaluation:  Plan of Care Reviewed With: patient  Patient Agreement with Plan of Care: agrees     Progress: improving  Outcome Evaluation: Pt still slightly confused but not as disoriented and slept frequently during shift. Pt denies SI/HI/AVH and craving and rates anxiety 8/10 and depression 6/10. Pt continues to be very tremulous and requires a sitter at bedside for safety.

## 2023-08-05 NOTE — PLAN OF CARE
Goal Outcome Evaluation:  Plan of Care Reviewed With: patient  Patient Agreement with Plan of Care: agrees     Progress: improving  Outcome Evaluation: Patient continues to be confused. He has been more cooperative today. He has responded well to the librium and has rested most of the day. Patient ate about 50% of his lunch. He continues to have a sitter at bedside for safety r/t his unsteady gait and confusion. No other issues noted will continue to monitor.
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
## 2023-08-05 NOTE — PROGRESS NOTES
"INPATIENT PSYCHIATRIC PROGRESS NOTE    Name:  Sachin Limon  :  1985  MRN:  [id number]  Visit Number:  [id number]  Length of stay:  2    SUBJECTIVE    CC/Focus of Exam: alcohol use and withdrawals    INTERVAL HISTORY:  The patient continues to be confused but he is less agitated and not actively hallucinating.  Appears to be responding well to Librium and not developing delirium tremens.    Review of Systems  Patient is confused  OBJECTIVE    Temp:  [96.9 øF (36.1 øC)-98.2 øF (36.8 øC)] 96.9 øF (36.1 øC)  Heart Rate:  [66-98] 66  Resp:  [16-18] 16  BP: (121-129)/(72-91) 121/77    MENTAL STATUS EXAM:  Appearance:Casually dressed, good hygeine.   Cooperation: Indifferent  Psychomotor: No psychomotor agitation/retardation, No EPS, No motor tics  Speech-minimal  Mood \"better\"   Affect-restricted  Thought Content- no delusional material present  Thought process-a bit disorganized.  Suicidality: No SI  Homicidality: No HI  Perception: No AH/VH  Insight-Limited  Judgement-Limited    Lab Results (last 24 hours)       ** No results found for the last 24 hours. **               Imaging Results (Last 24 Hours)       ** No results found for the last 24 hours. **               ECG/EMG Results (most recent)       Procedure Component Value Units Date/Time    ECG 12 Lead Other [[id number]] Collected: 23 1723     Updated: 23 1229     QT Interval 442 ms      QTC Interval 483 ms     Narrative:      Test Reason : Other~  Blood Pressure :   */*   mmHG  Vent. Rate :  72 BPM     Atrial Rate :  72 BPM     P-R Int : 128 ms          QRS Dur :  88 ms      QT Int : 442 ms       P-R-T Axes :   0  61  55 degrees     QTc Int : 483 ms    Normal sinus rhythm  Minimal voltage criteria for LVH, may be normal variant  Prolonged QT  Abnormal ECG  When compared with ECG of 01-AUG-2023 23:36,  No significant change was found  Confirmed by Aida Lobo () on 2023 12:29:27 PM    Referred By:            Confirmed By: Aida " [place]             ALLERGIES: Zoloft [sertraline]    Medication Review:   Scheduled Medications:  [START ON 2023] chlordiazePOXIDE, 10 mg, Oral, BID  [START ON 2023] chlordiazePOXIDE, 10 mg, Oral, Once  chlordiazePOXIDE, 25 mg, Oral, BID  escitalopram, 10 mg, Oral, Daily  levETIRAcetam, 500 mg, Oral, Q12H  LORazepam, 1.5 mg, Oral, 3 times per day   Followed by  [START ON 2023] LORazepam, 1 mg, Oral, 3 times per day   Followed by  [START ON 2023] LORazepam, 0.5 mg, Oral, 3 times per day  nicotine, 1 patch, Transdermal, Q24H  thiamine, 100 mg, Oral, Daily         PRN Medications:    acetaminophen    aluminum-magnesium hydroxide-simethicone    benzonatate    benztropine **OR** benztropine    famotidine    ibuprofen    loperamide    [] LORazepam **FOLLOWED BY** LORazepam **FOLLOWED BY** [START ON 2023] LORazepam **FOLLOWED BY** [START ON 2023] LORazepam    magnesium hydroxide    sodium chloride   All medications reviewed.    ASSESSMENT & PLAN:      Alcohol use disorder, severe, dependence    Alcohol withdrawal  -Ativan detox  -Thiamine and folate  -Added chlordiazepoxide detox to prevent DTs as patient has a history of alcohol withdrawal delirium       Depression  -Continue escitalopram       Seizure disorder  -Continue levetiracetam    Special precautions: Special Precautions Level 4 (q30 min checks).    Behavioral Health Treatment Plan and Problem List: I have reviewed and approved the Behavioral Health Treatment Plan and Problem list.  The patient has had a chance to review and agrees with the treatment plan.    Copied text in portions of this note has been reviewed and is accurate as of 23         Clinician:  Daria Sorto MD  23  13:16 EDT

## 2023-08-06 LAB
ALBUMIN SERPL-MCNC: 4.4 G/DL (ref 3.5–5.2)
ALBUMIN/GLOB SERPL: 1.8 G/DL
ALP SERPL-CCNC: 61 U/L (ref 39–117)
ALT SERPL W P-5'-P-CCNC: 82 U/L (ref 1–41)
ANION GAP SERPL CALCULATED.3IONS-SCNC: 11.1 MMOL/L (ref 5–15)
AST SERPL-CCNC: 106 U/L (ref 1–40)
BILIRUB SERPL-MCNC: 0.9 MG/DL (ref 0–1.2)
BUN SERPL-MCNC: 8 MG/DL (ref 6–20)
BUN/CREAT SERPL: 15.7 (ref 7–25)
CALCIUM SPEC-SCNC: 9.3 MG/DL (ref 8.6–10.5)
CHLORIDE SERPL-SCNC: 96 MMOL/L (ref 98–107)
CO2 SERPL-SCNC: 23.9 MMOL/L (ref 22–29)
CREAT SERPL-MCNC: 0.51 MG/DL (ref 0.76–1.27)
EGFRCR SERPLBLD CKD-EPI 2021: 132.3 ML/MIN/1.73
GLOBULIN UR ELPH-MCNC: 2.4 GM/DL
GLUCOSE SERPL-MCNC: 92 MG/DL (ref 65–99)
POTASSIUM SERPL-SCNC: 3.4 MMOL/L (ref 3.5–5.2)
POTASSIUM SERPL-SCNC: 4.5 MMOL/L (ref 3.5–5.2)
PROT SERPL-MCNC: 6.8 G/DL (ref 6–8.5)
SODIUM SERPL-SCNC: 131 MMOL/L (ref 136–145)

## 2023-08-06 PROCEDURE — 80053 COMPREHEN METABOLIC PANEL: CPT | Performed by: PSYCHIATRY & NEUROLOGY

## 2023-08-06 PROCEDURE — 84132 ASSAY OF SERUM POTASSIUM: CPT | Performed by: PSYCHIATRY & NEUROLOGY

## 2023-08-06 PROCEDURE — 99232 SBSQ HOSP IP/OBS MODERATE 35: CPT | Performed by: PSYCHIATRY & NEUROLOGY

## 2023-08-06 RX ORDER — POTASSIUM CHLORIDE 20 MEQ/1
40 TABLET, EXTENDED RELEASE ORAL EVERY 4 HOURS
Status: COMPLETED | OUTPATIENT
Start: 2023-08-06 | End: 2023-08-06

## 2023-08-06 RX ADMIN — LORAZEPAM 1 MG: 1 TABLET ORAL at 14:33

## 2023-08-06 RX ADMIN — POTASSIUM CHLORIDE 40 MEQ: 1500 TABLET, EXTENDED RELEASE ORAL at 08:28

## 2023-08-06 RX ADMIN — LEVETIRACETAM 500 MG: 500 TABLET, FILM COATED ORAL at 08:27

## 2023-08-06 RX ADMIN — LORAZEPAM 1 MG: 1 TABLET ORAL at 08:27

## 2023-08-06 RX ADMIN — LORAZEPAM 1 MG: 1 TABLET ORAL at 21:31

## 2023-08-06 RX ADMIN — Medication 100 MG: at 08:28

## 2023-08-06 RX ADMIN — ESCITALOPRAM 10 MG: 10 TABLET, FILM COATED ORAL at 08:28

## 2023-08-06 RX ADMIN — LEVETIRACETAM 500 MG: 500 TABLET, FILM COATED ORAL at 21:30

## 2023-08-06 RX ADMIN — CHLORDIAZEPOXIDE HYDROCHLORIDE 10 MG: 10 CAPSULE ORAL at 21:31

## 2023-08-06 RX ADMIN — CHLORDIAZEPOXIDE HYDROCHLORIDE 10 MG: 10 CAPSULE ORAL at 08:27

## 2023-08-06 RX ADMIN — POTASSIUM CHLORIDE 40 MEQ: 1500 TABLET, EXTENDED RELEASE ORAL at 12:29

## 2023-08-06 NOTE — PROGRESS NOTES
"INPATIENT PSYCHIATRIC PROGRESS NOTE    Name:  Sachin Limon  :  1985  MRN:  [id number]  Visit Number:  [id number]  Length of stay:  3    SUBJECTIVE    CC/Focus of Exam: alcohol use    INTERVAL HISTORY:  The patient reports feeling better, and mild withdrawals. No confusion, or hallucinations reported. Sleep is good.   Depression rating 3/10  Anxiety rating 3/10  Sleep: good  Withdrawal sx: weakness, tremors  Cravin/10    Review of Systems   Respiratory: Negative.     Cardiovascular: Negative.    Gastrointestinal: Negative.    Neurological:  Positive for tremors and weakness.     OBJECTIVE    Temp:  [97.1 øF (36.2 øC)-98 øF (36.7 øC)] 98 øF (36.7 øC)  Heart Rate:  [60-96] 67  Resp:  [16-18] 18  BP: (107-125)/(70-89) 107/70    MENTAL STATUS EXAM:  Appearance:Casually dressed, good hygeine.   Cooperation:Cooperative  Psychomotor: No psychomotor agitation/retardation, No EPS, No motor tics  Speech-normal rate, amount.  Mood \"better\"   Affect-congruent, appropriate, stable  Thought Content-goal directed, no delusional material present  Thought process-linear, organized.  Suicidality: No SI  Homicidality: No HI  Perception: No AH/VH  Insight-fair   Judgement-fair    Lab Results (last 24 hours)       Procedure Component Value Units Date/Time    Comprehensive Metabolic Panel [[id number]]  (Abnormal) Collected: 23    Specimen: Blood Updated: 23     Glucose 92 mg/dL      BUN 8 mg/dL      Creatinine 0.51 mg/dL      Sodium 131 mmol/L      Potassium 3.4 mmol/L      Chloride 96 mmol/L      CO2 23.9 mmol/L      Calcium 9.3 mg/dL      Total Protein 6.8 g/dL      Albumin 4.4 g/dL      ALT (SGPT) 82 U/L      AST (SGOT) 106 U/L      Alkaline Phosphatase 61 U/L      Total Bilirubin 0.9 mg/dL      Globulin 2.4 gm/dL      A/G Ratio 1.8 g/dL      BUN/Creatinine Ratio 15.7     Anion Gap 11.1 mmol/L      eGFR 132.3 mL/min/1.73     Narrative:      GFR Normal >60  Chronic Kidney Disease <60  Kidney Failure " <15      COVID PRE-OP / PRE-PROCEDURE SCREENING ORDER (NO ISOLATION) - Swab, Nasal Cavity [[id number]]  (Normal) Collected: 08/05/23 1433    Specimen: Swab from Nasal Cavity Updated: 08/05/23 1530    Narrative:      The following orders were created for panel order COVID PRE-OP / PRE-PROCEDURE SCREENING ORDER (NO ISOLATION) - Swab, Nasal Cavity.  Procedure                               Abnormality         Status                     ---------                               -----------         ------                     COVID-19 and FLU A/B PCR...[[id number]]  Normal              Final result                 Please view results for these tests on the individual orders.    COVID-19 and FLU A/B PCR - Swab, Nasopharynx [[id number]]  (Normal) Collected: 08/05/23 1433    Specimen: Swab from Nasopharynx Updated: 08/05/23 1530     COVID19 Not Detected     Influenza A PCR Not Detected     Influenza B PCR Not Detected    Narrative:      Fact sheet for providers: https://www.fda.gov/media/788961/download    Fact sheet for patients: https://www.fda.gov/media/790843/download    Test performed by PCR.               Imaging Results (Last 24 Hours)       ** No results found for the last 24 hours. **               ECG/EMG Results (most recent)       Procedure Component Value Units Date/Time    ECG 12 Lead Other [[id number]] Collected: 08/03/23 1723     Updated: 08/04/23 1229     QT Interval 442 ms      QTC Interval 483 ms     Narrative:      Test Reason : Other~  Blood Pressure :   */*   mmHG  Vent. Rate :  72 BPM     Atrial Rate :  72 BPM     P-R Int : 128 ms          QRS Dur :  88 ms      QT Int : 442 ms       P-R-T Axes :   0  61  55 degrees     QTc Int : 483 ms    Normal sinus rhythm  Minimal voltage criteria for LVH, may be normal variant  Prolonged QT  Abnormal ECG  When compared with ECG of 01-AUG-2023 23:36,  No significant change was found  Confirmed by Aida Lobo ([id number]) on 8/4/2023 12:29:27 PM    Referred By:             Confirmed By: Aida Lobo             ALLERGIES: Zoloft [sertraline]    Medication Review:   Scheduled Medications:  chlordiazePOXIDE, 10 mg, Oral, BID  [START ON 2023] chlordiazePOXIDE, 10 mg, Oral, Once  escitalopram, 10 mg, Oral, Daily  levETIRAcetam, 500 mg, Oral, Q12H  LORazepam, 1 mg, Oral, 3 times per day   Followed by  [START ON 2023] LORazepam, 0.5 mg, Oral, 3 times per day  nicotine, 1 patch, Transdermal, Q24H  Potassium Replacement - Follow Nurse / BPA Driven Protocol, , Does not apply, Once  thiamine, 100 mg, Oral, Daily         PRN Medications:    acetaminophen    aluminum-magnesium hydroxide-simethicone    benzonatate    benztropine **OR** benztropine    famotidine    ibuprofen    loperamide    [] LORazepam **FOLLOWED BY** [] LORazepam **FOLLOWED BY** LORazepam **FOLLOWED BY** [START ON 2023] LORazepam    magnesium hydroxide    sodium chloride   All medications reviewed.    ASSESSMENT & PLAN:      Alcohol use disorder, severe, dependence    Alcohol withdrawal  -Ativan detox  -Thiamine and folate  -Added chlordiazepoxide detox to prevent DTs as patient has a history of alcohol withdrawal delirium       Depression  -Continue escitalopram       Seizure disorder  -Continue levetiracetam    Special precautions: Special Precautions Level 4 (q30 min checks).    Behavioral Health Treatment Plan and Problem List: I have reviewed and approved the Behavioral Health Treatment Plan and Problem list.  The patient has had a chance to review and agrees with the treatment plan.    Copied text in portions of this note has been reviewed and is accurate as of 23         Clinician:  Daria Sorto MD  23  15:05 EDT

## 2023-08-06 NOTE — PLAN OF CARE
Goal Outcome Evaluation:  Plan of Care Reviewed With: patient  Patient Agreement with Plan of Care: agrees        Outcome Evaluation: Pt in bed most of day. Come to dinning room for meals only. Sitter at bedside for safety. No distress noted

## 2023-08-06 NOTE — PLAN OF CARE
Goal Outcome Evaluation:  Plan of Care Reviewed With: patient  Patient Agreement with Plan of Care: agrees     Progress: improving  Outcome Evaluation: Pt spent more time out of his room tonight and participated in group. Pt still unsteady but slightly improved and still requires a sitter at bedside for safety. Pt denies SI/HI/AVH and craving and rates anxiety 4/10 and depression 3/10.  Pt appeared to sleep throughout the night.

## 2023-08-06 NOTE — PLAN OF CARE
Goal Outcome Evaluation:  Plan of Care Reviewed With: patient  Patient Agreement with Plan of Care: agrees     Progress: improving  Outcome Evaluation: Pt continues to require sitter at bedside for safety. Pt gait slightly improved since yesterday and denies SI/HI/AVH.    Pt appeared to sleep throughout the night.

## 2023-08-07 LAB
ANION GAP SERPL CALCULATED.3IONS-SCNC: 11.1 MMOL/L (ref 5–15)
BUN SERPL-MCNC: 9 MG/DL (ref 6–20)
BUN/CREAT SERPL: 15.8 (ref 7–25)
CALCIUM SPEC-SCNC: 9.5 MG/DL (ref 8.6–10.5)
CHLORIDE SERPL-SCNC: 100 MMOL/L (ref 98–107)
CO2 SERPL-SCNC: 24.9 MMOL/L (ref 22–29)
CREAT SERPL-MCNC: 0.57 MG/DL (ref 0.76–1.27)
EGFRCR SERPLBLD CKD-EPI 2021: 127.9 ML/MIN/1.73
GLUCOSE SERPL-MCNC: 93 MG/DL (ref 65–99)
POTASSIUM SERPL-SCNC: 4.3 MMOL/L (ref 3.5–5.2)
SODIUM SERPL-SCNC: 136 MMOL/L (ref 136–145)

## 2023-08-07 PROCEDURE — 80048 BASIC METABOLIC PNL TOTAL CA: CPT | Performed by: PSYCHIATRY & NEUROLOGY

## 2023-08-07 PROCEDURE — 99232 SBSQ HOSP IP/OBS MODERATE 35: CPT | Performed by: PSYCHIATRY & NEUROLOGY

## 2023-08-07 RX ADMIN — LEVETIRACETAM 500 MG: 500 TABLET, FILM COATED ORAL at 08:22

## 2023-08-07 RX ADMIN — LORAZEPAM 0.5 MG: 0.5 TABLET ORAL at 08:22

## 2023-08-07 RX ADMIN — CHLORDIAZEPOXIDE HYDROCHLORIDE 10 MG: 10 CAPSULE ORAL at 08:22

## 2023-08-07 RX ADMIN — IBUPROFEN 400 MG: 400 TABLET, FILM COATED ORAL at 08:22

## 2023-08-07 RX ADMIN — LEVETIRACETAM 500 MG: 500 TABLET, FILM COATED ORAL at 21:06

## 2023-08-07 RX ADMIN — LORAZEPAM 0.5 MG: 0.5 TABLET ORAL at 21:06

## 2023-08-07 RX ADMIN — Medication 100 MG: at 08:22

## 2023-08-07 RX ADMIN — ESCITALOPRAM 10 MG: 10 TABLET, FILM COATED ORAL at 08:22

## 2023-08-07 RX ADMIN — LORAZEPAM 0.5 MG: 0.5 TABLET ORAL at 14:28

## 2023-08-07 NOTE — PROGRESS NOTES
"INPATIENT PSYCHIATRIC PROGRESS NOTE    Name:  Sachin Limon  :  1985  MRN:  [id number]  Visit Number:  [id number]  Length of stay:  4    SUBJECTIVE    CC/Focus of Exam: alcohol use    INTERVAL HISTORY:  The patient seen in his room. He is resting comfortably. Has some ongoing weakness and tremors but is able to ambulate without any difficulty or need for support. He reports no confusion or hallucinations.    Depression rating 2/10  Anxiety rating 2/10  Sleep: good  Withdrawal sx: weakness, tremors  Cravin/10    Review of Systems   Respiratory: Negative.     Cardiovascular: Negative.    Gastrointestinal: Negative.    Neurological:  Positive for tremors and weakness.     OBJECTIVE    Temp:  [97.3 øF (36.3 øC)-98.4 øF (36.9 øC)] 97.3 øF (36.3 øC)  Heart Rate:  [61-87] 87  Resp:  [16-18] 18  BP: ()/(60-70) 94/62    MENTAL STATUS EXAM:  Appearance:Casually dressed, good hygeine.   Cooperation:Cooperative  Psychomotor: No psychomotor agitation/retardation, No EPS, No motor tics  Speech-normal rate, amount.  Mood \"better\"   Affect-congruent, appropriate, stable  Thought Content-goal directed, no delusional material present  Thought process-linear, organized.  Suicidality: No SI  Homicidality: No HI  Perception: No AH/VH  Insight-fair   Judgement-fair    Lab Results (last 24 hours)       Procedure Component Value Units Date/Time    Basic Metabolic Panel [[id number]]  (Abnormal) Collected: 23 0522    Specimen: Blood Updated: 23 0641     Glucose 93 mg/dL      BUN 9 mg/dL      Creatinine 0.57 mg/dL      Sodium 136 mmol/L      Potassium 4.3 mmol/L      Chloride 100 mmol/L      CO2 24.9 mmol/L      Calcium 9.5 mg/dL      BUN/Creatinine Ratio 15.8     Anion Gap 11.1 mmol/L      eGFR 127.9 mL/min/1.73     Narrative:      GFR Normal >60  Chronic Kidney Disease <60  Kidney Failure <15      Potassium [[id number]]  (Normal) Collected: 23 1532    Specimen: Blood Updated: 23 1605     Potassium " 4.5 mmol/L      Comment: Slight hemolysis detected by analyzer. Results may be affected.                  Imaging Results (Last 24 Hours)       ** No results found for the last 24 hours. **               ECG/EMG Results (most recent)       Procedure Component Value Units Date/Time    ECG 12 Lead Other [[id number]] Collected: 23     Updated: 23 1229     QT Interval 442 ms      QTC Interval 483 ms     Narrative:      Test Reason : Other~  Blood Pressure :   */*   mmHG  Vent. Rate :  72 BPM     Atrial Rate :  72 BPM     P-R Int : 128 ms          QRS Dur :  88 ms      QT Int : 442 ms       P-R-T Axes :   0  61  55 degrees     QTc Int : 483 ms    Normal sinus rhythm  Minimal voltage criteria for LVH, may be normal variant  Prolonged QT  Abnormal ECG  When compared with ECG of 01-AUG-2023 23:36,  No significant change was found  Confirmed by Aida Lobo () on 2023 12:29:27 PM    Referred By:            Confirmed By: Aida Lobo             ALLERGIES: Zoloft [sertraline]    Medication Review:   Scheduled Medications:  escitalopram, 10 mg, Oral, Daily  levETIRAcetam, 500 mg, Oral, Q12H  LORazepam, 0.5 mg, Oral, 3 times per day  nicotine, 1 patch, Transdermal, Q24H  Potassium Replacement - Follow Nurse / BPA Driven Protocol, , Does not apply, Once  thiamine, 100 mg, Oral, Daily         PRN Medications:    acetaminophen    aluminum-magnesium hydroxide-simethicone    benzonatate    benztropine **OR** benztropine    famotidine    ibuprofen    loperamide    [] LORazepam **FOLLOWED BY** [] LORazepam **FOLLOWED BY** [] LORazepam **FOLLOWED BY** LORazepam    magnesium hydroxide    sodium chloride   All medications reviewed.    ASSESSMENT & PLAN:      Alcohol use disorder, severe, dependence    Alcohol withdrawal  -Continue Ativan detox  -Thiamine and folate  -Added chlordiazepoxide detox to prevent DTs as patient has a history of alcohol withdrawal delirium       Depression  -Continue  escitalopram       Seizure disorder  -Continue levetiracetam      Discontinue sitter as patient appears more stable. Plan discharge tomorrow.     Special precautions: Special Precautions Level 4 (q30 min checks).    Behavioral Health Treatment Plan and Problem List: I have reviewed and approved the Behavioral Health Treatment Plan and Problem list.  The patient has had a chance to review and agrees with the treatment plan.    Copied text in portions of this note has been reviewed and is accurate as of 08/07/23         Clinician:  Daria Sorto MD  08/07/23  09:33 EDT

## 2023-08-07 NOTE — PLAN OF CARE
Goal Outcome Evaluation:  Plan of Care Reviewed With: patient  Patient Agreement with Plan of Care: agrees        Outcome Evaluation: Pt strength greatly improved this shift. Order for sitter d/c. Pt steady gait with very mild tremors

## 2023-08-07 NOTE — PLAN OF CARE
Goal Outcome Evaluation:        Problem: Adult Behavioral Health Plan of Care  Goal: Patient-Specific Goal (Individualization)  Outcome: Ongoing, Progressing  Flowsheets  Taken 8/4/2023 1342 by Samara Polo CSW  Patient-Specific Goals (Include Timeframe): Identify 2-3 coping skills, address relapse prevention methods, complete aftercare plans, and deny SI/HI prior to discharge.  Individualized Care Needs: Therapist to offer 1-4 therapy sessions, aftercare planning, safety planning, family education group therapy, and brief CBT/MI interventions.  Taken 8/4/2023 1030 by Samara Polo CSW  Patient Personal Strengths:   resilient   resourceful   socioeconomic stability   stable living environment   motivated for treatment   motivated for recovery   coping skills   expressive of emotions   expressive of needs   intellectual cognitive skills   positive vocational history   family/social support  Patient Vulnerabilities:   substance abuse/addiction   history of unsuccessful treatment   poor impulse control   traumatic event  Taken 8/3/2023 1510 by Verna Fermin RN  Anxieties, Fears or Concerns: none reported  Goal: Optimized Coping Skills in Response to Life Stressors  Outcome: Ongoing, Progressing  Flowsheets (Taken 8/4/2023 1342)  Optimized Coping Skills in Response to Life Stressors: making progress toward outcome  Intervention: Promote Effective Coping Strategies  Flowsheets (Taken 8/7/2023 1346)  Supportive Measures:   active listening utilized   counseling provided   decision-making supported   goal-setting facilitated   verbalization of feelings encouraged   self-responsibility promoted   positive reinforcement provided   self-reflection promoted   self-care encouraged  Goal: Develops/Participates in Therapeutic [place] to Support Successful Transition  Outcome: Ongoing, Progressing  Flowsheets (Taken 8/4/2023 1342)  Develops/Participates in Therapeutic [place] to Support Successful Transition:  making progress toward outcome  Intervention: Foster Therapeutic [place]  Flowsheets (Taken 8/7/2023 1346)  Trust Relationship/Rapport:   care explained   reassurance provided   choices provided   emotional support provided   thoughts/feelings acknowledged   empathic listening provided   questions answered   questions encouraged  Intervention: Mutually Develop Transition Plan  Flowsheets  Taken 8/7/2023 1345  Discharge Coordination/Progress: Patient has [employer]. Patient to return to [facility] when stable, agency to transport.  Transportation Anticipated: agency  Transportation Concerns: none  Current Discharge Risk: substance use/abuse  Concerns to be Addressed:   mental health   coping/stress   cognitive/perceptual   substance/tobacco abuse/use   discharge planning  Readmission Within the Last 30 Days: no previous admission in last 30 days  Patient/Family Anticipated Services at Transition: rehabilitation services  Patient's Choice of Community Agency(s): [facility]  Patient/Family Anticipates Transition to: inpatient rehabilitation facility  Offered/Gave Vendor List: no  Taken 8/4/2023 1342  Outpatient/Agency/Support Group Needs: residential services  Transition Support:   follow-up care discussed   follow-up care coordinated   community resources reviewed   crisis management plan promoted   crisis management plan verbalized  Anticipated Discharge Disposition: residential substance use unit         DATA:  Therapist met with Patient individually this date. Patient agreeable to discuss current treatment progress and discharge concerns.     Consents signed for [facility] and [facility].    CLINICAL MANUVERING/INTERVENTIONS:  Assisted Patient in processing session content; acknowledged and normalized Patient's thoughts, feelings, and concerns by utilizing a person-centered approach in efforts to build appropriate rapport and a positive therapeutic relationship with open and honest  communication. Allowed Patient to ventilate regarding current stressors and triggers for negative emotions and thoughts in a safe nonjudgmental environment with unconditional positive regard, active listening skills, and empathy. Therapist implemented motivational interviewing techniques to assist Patient with exploring personal growth and change and discussed distress tolerance skills, self soothing techniques, and applied cognitive behavioral strategies to facilitate identification of maladaptive patterns of thinking and behavior.Therapist utilized dialectical behavior techniques to teach and model emotional regulation and relaxation methods. Therapist assisted Patient with identifying and implementing healthier coping strategies. Therapist assisted Patient with safety planning; Patient agreed to continue honest communication with Treatment Team while inpatient and identify any SI/HI.  Patient encouraged to seek nearest ER or contact 911 if danger to self or others post discharge.     ASSESSMENT:    Patient continues to receive treatment for detox, anticipate patient being stable for discharge tomorrow. Patient's mental status and stability have improved and patient is now able to ambulate independently. Patient reports mild anxiety and depression, denies current SI/HI/AVH. Patient reports mild weakness and tremors. Patient was brought in by [facility] and is agreeable to return at discharge, agency scheduled to provide transportation around 13:00 tomorrow.     PLAN:   Patient will continue stabilization. Patient will continue to receive services offered by Treatment Team.     Return to [facility] at discharge.

## 2023-08-07 NOTE — PROGRESS NOTES
Navigator is helping Primary Therapist with the following referral:    [facility] [phone]  -Spoke with [facility]. They are agreeable to pick patient up tomorrow. ETA 1:00pm.  8/7

## 2023-08-08 VITALS
RESPIRATION RATE: 18 BRPM | WEIGHT: 117 LBS | OXYGEN SATURATION: 96 % | HEART RATE: 69 BPM | SYSTOLIC BLOOD PRESSURE: 121 MMHG | BODY MASS INDEX: 18.36 KG/M2 | DIASTOLIC BLOOD PRESSURE: 73 MMHG | TEMPERATURE: 96.8 F | HEIGHT: 67 IN

## 2023-08-08 PROCEDURE — 99238 HOSP IP/OBS DSCHRG MGMT 30/<: CPT | Performed by: PSYCHIATRY & NEUROLOGY

## 2023-08-08 RX ORDER — TRAZODONE HYDROCHLORIDE 50 MG/1
50-100 TABLET ORAL NIGHTLY PRN
Qty: 30 TABLET | Refills: 0 | Status: SHIPPED | OUTPATIENT
Start: 2023-08-08

## 2023-08-08 RX ADMIN — ESCITALOPRAM 10 MG: 10 TABLET, FILM COATED ORAL at 08:10

## 2023-08-08 RX ADMIN — IBUPROFEN 400 MG: 400 TABLET, FILM COATED ORAL at 08:10

## 2023-08-08 RX ADMIN — Medication 100 MG: at 08:10

## 2023-08-08 RX ADMIN — LEVETIRACETAM 500 MG: 500 TABLET, FILM COATED ORAL at 08:09

## 2023-08-08 NOTE — PLAN OF CARE
Problem: Adult Behavioral Health Plan of Care  Goal: Plan of Care Review  Outcome: Adequate for Care Transition  Flowsheets (Taken 8/8/2023 1805)  Progress: improving  Plan of Care Reviewed With: patient  Patient Agreement with Plan of Care: agrees  Outcome Evaluation: Pt is being discharged today.   Goal Outcome Evaluation:  Plan of Care Reviewed With: patient  Patient Agreement with Plan of Care: agrees     Progress: improving  Outcome Evaluation: Pt is being discharged today.

## 2023-08-08 NOTE — PROGRESS NOTES
Behavioral Health Discharge Summary             Please fax within 24 hours of discharge to [facility] at: [phone]      Member Name: Sachin Limon Member ID: [id number]   Authorization Number: [id number] Phone: [phone]   Member Address: [address] [place] [address]   Discharge Date: 08/08/2023 Level of Care at Discharge:    Facility: [facility] Staff Completing Form: Maria L ONEAL   If the member is being discharged directly to a residential or extended care program, please specify the type below.   __Private Child-Caring Facility (PCC) Residential/Group Home   __Private Child-Caring Facility (PCC) Therapeutic Foster Care   __Residential Treatment Facility (RTF)   __Psychiatric Residential Treatment Facility (PRTF I or II)   __Long-Term Acute Inpatient Hospital Services or Extended Care Unit (ECU)   __Other (please specify):    Brief discharge summary of treatment received (for follow up by the case management team): D/C clinical with list of medications and follow up appts given to patient upon discharge.     BRIEF SUMMARY OF RECOMMENDATIONS FOR ONGOING TREATMENT     Discharged to where: [facility]   Discharge diagnoses: F 10.10   Axis I:    Axis II:    Axis III:    Axis IV:    Axis V:    Does the member understand his/her DX?  Yes          Medication     Dose     Schedule Supply/  Quantity  Given at Discharge RX Provided  Yes/No  If Rx Provided, Quantity RX Prior Auth Required  Yes/No Prior Auth  Completed                                                                                             Does the member understand the reason for taking these medications? Yes                                                           FOLLOW-UP APPOINTMENTS   Please schedule within 7 days of discharge and provide appointment details for all referred services.    PCP/Other Providers Involved in Treatment:    Appointment Type:   Rehab  Provider Name: [facility]  Provider Phone:   [phone] Appointment Date: 08/08/2023 Appointment Time:   Admit     Assessment   (new to OP services)        Case Management    Is the member already enrolled in case management?  Yes/No  If yes, date the CM was notified:    If no, was the CM referral offered?  Yes/No  Accepted? Yes/No    Is the Release of Information in the chart? Yes/No:      Medication Management (for member discharged with psychiatric medications):      A&D Treatment (for member with substance abuse/   dependence in the past year):      Medical Condition (for member with a medical condition):    Other recommended treatment:    Do you have any concerns about the discharge plan?  No    If yes, explain:    Was the member involved in the discharge planning?  Yes    If no, explain:    Was a copy of the discharge plan provided to the member?  Yes    If no, explain:

## 2023-08-08 NOTE — PLAN OF CARE
Goal Outcome Evaluation:  Plan of Care Reviewed With: patient  Patient Agreement with Plan of Care: agrees     Progress: improving  Outcome Evaluation: Patient cooperative during assessment. Remained in room majority of shift and has rested well. Very few withdrawal symptoms. Anxiety 3 and depression 3. Denied SI/HI/AVH.

## 2023-08-08 NOTE — DISCHARGE SUMMARY
":  1985  MRN:  [id number]  Visit Number:  [id number]      Date of Admission:8/3/2023   Date of Discharge:  2023    Discharge Diagnosis:  Active Problems:    Alcohol use disorder, severe, dependence    Alcohol withdrawal    Depression    Seizure disorder        Admission Diagnosis:  Alcohol abuse [F10.10]     HPI  Sachin Limon is a 38 y.o. male who was admitted on 8/3/2023 with complaints of alcohol use and withdrawals.   For details please see H&P dated 23.    Hospital Course  Patient is a 38 y.o. male presented with alcohol use and withdrawals. The patient was first admitted to the medical service given his history of severe withdrawals and DTs and then transferred to the detox recovery unit. He was started on Ativan detox and due to severity of his withdrawals symptoms where he appeared on the verge of DTs, Librium detox was added. The patient's withdrawals symptoms subsided over the course of his treatment and by the time the detox ended, he was no longer experiencing active withdrawals. Sleep and appetite were improved.  The day of discharge the patient was calm, cooperative and pleasant. Mood was reported to be good, and denied SI/HI/AVH. Also reported no medication side effects.  .      Mental Status Exam upon discharge:   Mood \"good\"   Affect-congruent, appropriate, stable  Thought Content-goal directed, no delusional material present  Thought process-linear, organized.  Suicidality: No SI  Homicidality: No HI  Perception: No AH/VH    Procedures Performed         Consults:   Consults       Date and Time Order Name Status Description    2023  8:16 AM Inpatient Psychiatrist Consult Completed             Pertinent Test Results:   Admission on 2023   Component Date Value Ref Range Status    QT Interval 2023 442  ms Final    QTC Interval 2023 483  ms Final    COVID19 2023 Not Detected  Not Detected - Ref. Range Final    Influenza A PCR 2023 Not Detected  Not " Detected Final    Influenza B PCR 08/05/2023 Not Detected  Not Detected Final    Glucose 08/06/2023 92  65 - 99 mg/dL Final    BUN 08/06/2023 8  6 - 20 mg/dL Final    Creatinine 08/06/2023 0.51 (L)  0.76 - 1.27 mg/dL Final    Sodium 08/06/2023 131 (L)  136 - 145 mmol/L Final    Potassium 08/06/2023 3.4 (L)  3.5 - 5.2 mmol/L Final    Chloride 08/06/2023 96 (L)  98 - 107 mmol/L Final    CO2 08/06/2023 23.9  22.0 - 29.0 mmol/L Final    Calcium 08/06/2023 9.3  8.6 - 10.5 mg/dL Final    Total Protein 08/06/2023 6.8  6.0 - 8.5 g/dL Final    Albumin 08/06/2023 4.4  3.5 - 5.2 g/dL Final    ALT (SGPT) 08/06/2023 82 (H)  1 - 41 U/L Final    AST (SGOT) 08/06/2023 106 (H)  1 - 40 U/L Final    Alkaline Phosphatase 08/06/2023 61  39 - 117 U/L Final    Total Bilirubin 08/06/2023 0.9  0.0 - 1.2 mg/dL Final    Globulin 08/06/2023 2.4  gm/dL Final    A/G Ratio 08/06/2023 1.8  g/dL Final    BUN/Creatinine Ratio 08/06/2023 15.7  7.0 - 25.0 Final    Anion Gap 08/06/2023 11.1  5.0 - 15.0 mmol/L Final    eGFR 08/06/2023 132.3  >60.0 mL/min/1.73 Final    Potassium 08/06/2023 4.5  3.5 - 5.2 mmol/L Final    Slight hemolysis detected by analyzer. Results may be affected.    Glucose 08/07/2023 93  65 - 99 mg/dL Final    BUN 08/07/2023 9  6 - 20 mg/dL Final    Creatinine 08/07/2023 0.57 (L)  0.76 - 1.27 mg/dL Final    Sodium 08/07/2023 136  136 - 145 mmol/L Final    Potassium 08/07/2023 4.3  3.5 - 5.2 mmol/L Final    Chloride 08/07/2023 100  98 - 107 mmol/L Final    CO2 08/07/2023 24.9  22.0 - 29.0 mmol/L Final    Calcium 08/07/2023 9.5  8.6 - 10.5 mg/dL Final    BUN/Creatinine Ratio 08/07/2023 15.8  7.0 - 25.0 Final    Anion Gap 08/07/2023 11.1  5.0 - 15.0 mmol/L Final    eGFR 08/07/2023 127.9  >60.0 mL/min/1.73 Final   Admission on 08/01/2023, Discharged on 08/03/2023   Component Date Value Ref Range Status    Glucose 08/01/2023 147 (H)  65 - 99 mg/dL Final    BUN 08/01/2023 12  6 - 20 mg/dL Final    Creatinine 08/01/2023 0.62 (L)  0.76 -  1.27 mg/dL Final    Sodium 08/01/2023 145  136 - 145 mmol/L Final    Potassium 08/01/2023 3.9  3.5 - 5.2 mmol/L Final    Slight hemolysis detected by analyzer. Results may be affected.    Chloride 08/01/2023 99  98 - 107 mmol/L Final    CO2 08/01/2023 26.6  22.0 - 29.0 mmol/L Final    Calcium 08/01/2023 9.5  8.6 - 10.5 mg/dL Final    Total Protein 08/01/2023 7.7  6.0 - 8.5 g/dL Final    Albumin 08/01/2023 4.8  3.5 - 5.2 g/dL Final    ALT (SGPT) 08/01/2023 47 (H)  1 - 41 U/L Final    AST (SGOT) 08/01/2023 92 (H)  1 - 40 U/L Final    Alkaline Phosphatase 08/01/2023 61  39 - 117 U/L Final    Total Bilirubin 08/01/2023 0.4  0.0 - 1.2 mg/dL Final    Globulin 08/01/2023 2.9  gm/dL Final    A/G Ratio 08/01/2023 1.7  g/dL Final    BUN/Creatinine Ratio 08/01/2023 19.4  7.0 - 25.0 Final    Anion Gap 08/01/2023 19.4 (H)  5.0 - 15.0 mmol/L Final    eGFR 08/01/2023 124.7  >60.0 mL/min/1.73 Final    Acetaminophen 08/01/2023 <5.0  0.0 - 30.0 mcg/mL Final    Ethanol 08/01/2023 574 (H)  0 - 10 mg/dL Final    Ethanol % 08/01/2023 0.574  % Final    Salicylate 08/01/2023 <0.3  <=30.0 mg/dL Final    THC, Screen, Urine 08/01/2023 Negative  Negative Final    Phencyclidine (PCP), Urine 08/01/2023 Negative  Negative Final    Cocaine Screen, Urine 08/01/2023 Negative  Negative Final    Methamphetamine, Ur 08/01/2023 Negative  Negative Final    Opiate Screen 08/01/2023 Negative  Negative Final    Amphetamine Screen, Urine 08/01/2023 Negative  Negative Final    Benzodiazepine Screen, Urine 08/01/2023 Negative  Negative Final    Tricyclic Antidepressants Screen 08/01/2023 Negative  Negative Final    Methadone Screen, Urine 08/01/2023 Negative  Negative Final    Barbiturates Screen, Urine 08/01/2023 Negative  Negative Final    Oxycodone Screen, Urine 08/01/2023 Negative  Negative Final    Propoxyphene Screen 08/01/2023 Negative  Negative Final    Buprenorphine, Screen, Urine 08/01/2023 Negative  Negative Final    TSH 08/01/2023 0.606  0.270 -  4.200 uIU/mL Final    Extra Tube 08/01/2023 Hold for add-ons.   Final    Auto resulted.    Extra Tube 08/01/2023 hold for add-on   Final    Auto resulted    Extra Tube 08/01/2023 Hold for add-ons.   Final    Auto resulted.    Extra Tube 08/01/2023 Hold for add-ons.   Final    Auto resulted    WBC 08/01/2023 2.66 (L)  3.40 - 10.80 10*3/mm3 Final    RBC 08/01/2023 4.15  4.14 - 5.80 10*6/mm3 Final    Hemoglobin 08/01/2023 13.7  13.0 - 17.7 g/dL Final    Hematocrit 08/01/2023 40.2  37.5 - 51.0 % Final    MCV 08/01/2023 96.9  79.0 - 97.0 fL Final    MCH 08/01/2023 33.0  26.6 - 33.0 pg Final    MCHC 08/01/2023 34.1  31.5 - 35.7 g/dL Final    RDW 08/01/2023 14.5  12.3 - 15.4 % Final    RDW-SD 08/01/2023 52.0  37.0 - 54.0 fl Final    MPV 08/01/2023 10.3  6.0 - 12.0 fL Final    Platelets 08/01/2023 149  140 - 450 10*3/mm3 Final    Neutrophil % 08/01/2023 50.7  42.7 - 76.0 % Final    Lymphocyte % 08/01/2023 38.0  19.6 - 45.3 % Final    Monocyte % 08/01/2023 9.4  5.0 - 12.0 % Final    Eosinophil % 08/01/2023 0.4  0.3 - 6.2 % Final    Basophil % 08/01/2023 1.5  0.0 - 1.5 % Final    Immature Grans % 08/01/2023 0.0  0.0 - 0.5 % Final    Neutrophils, Absolute 08/01/2023 1.35 (L)  1.70 - 7.00 10*3/mm3 Final    Lymphocytes, Absolute 08/01/2023 1.01  0.70 - 3.10 10*3/mm3 Final    Monocytes, Absolute 08/01/2023 0.25  0.10 - 0.90 10*3/mm3 Final    Eosinophils, Absolute 08/01/2023 0.01  0.00 - 0.40 10*3/mm3 Final    Basophils, Absolute 08/01/2023 0.04  0.00 - 0.20 10*3/mm3 Final    Immature Grans, Absolute 08/01/2023 0.00  0.00 - 0.05 10*3/mm3 Final    nRBC 08/01/2023 0.0  0.0 - 0.2 /100 WBC Final    Creatine Kinase 08/01/2023 112  20 - 200 U/L Final    Lactate 08/01/2023 3.3 (C)  0.5 - 2.0 mmol/L Final    Fentanyl, Urine 08/01/2023 Negative  Negative Final    Glucose 08/01/2023 134 (H)  70 - 130 mg/dL Final    RN Notified Meter: [id number] : [id number] MICHOACANO DENISE    Lactate 08/02/2023 2.5 (C)  0.5 - 2.0 mmol/L Final    Lipase  08/01/2023 80 (H)  13 - 60 U/L Final    QT Interval 08/01/2023 400  ms Final    QTC Interval 08/01/2023 467  ms Final    Lactate 08/02/2023 2.0  0.5 - 2.0 mmol/L Final    WBC 08/02/2023 2.72 (L)  3.40 - 10.80 10*3/mm3 Final    RBC 08/02/2023 3.76 (L)  4.14 - 5.80 10*6/mm3 Final    Hemoglobin 08/02/2023 12.4 (L)  13.0 - 17.7 g/dL Final    Hematocrit 08/02/2023 37.5  37.5 - 51.0 % Final    MCV 08/02/2023 99.7 (H)  79.0 - 97.0 fL Final    MCH 08/02/2023 33.0  26.6 - 33.0 pg Final    MCHC 08/02/2023 33.1  31.5 - 35.7 g/dL Final    RDW 08/02/2023 14.5  12.3 - 15.4 % Final    RDW-SD 08/02/2023 53.7  37.0 - 54.0 fl Final    MPV 08/02/2023 10.7  6.0 - 12.0 fL Final    Platelets 08/02/2023 118 (L)  140 - 450 10*3/mm3 Final    Neutrophil % 08/02/2023 37.2 (L)  42.7 - 76.0 % Final    Lymphocyte % 08/02/2023 51.8 (H)  19.6 - 45.3 % Final    Monocyte % 08/02/2023 7.7  5.0 - 12.0 % Final    Eosinophil % 08/02/2023 1.8  0.3 - 6.2 % Final    Basophil % 08/02/2023 1.5  0.0 - 1.5 % Final    Immature Grans % 08/02/2023 0.0  0.0 - 0.5 % Final    Neutrophils, Absolute 08/02/2023 1.01 (L)  1.70 - 7.00 10*3/mm3 Final    Lymphocytes, Absolute 08/02/2023 1.41  0.70 - 3.10 10*3/mm3 Final    Monocytes, Absolute 08/02/2023 0.21  0.10 - 0.90 10*3/mm3 Final    Eosinophils, Absolute 08/02/2023 0.05  0.00 - 0.40 10*3/mm3 Final    Basophils, Absolute 08/02/2023 0.04  0.00 - 0.20 10*3/mm3 Final    Immature Grans, Absolute 08/02/2023 0.00  0.00 - 0.05 10*3/mm3 Final    nRBC 08/02/2023 0.0  0.0 - 0.2 /100 WBC Final    Glucose 08/02/2023 82  65 - 99 mg/dL Final    BUN 08/02/2023 6  6 - 20 mg/dL Final    Creatinine 08/02/2023 0.38 (L)  0.76 - 1.27 mg/dL Final    Sodium 08/02/2023 142  136 - 145 mmol/L Final    Potassium 08/02/2023 3.9  3.5 - 5.2 mmol/L Final    Chloride 08/02/2023 103  98 - 107 mmol/L Final    CO2 08/02/2023 24.0  22.0 - 29.0 mmol/L Final    Calcium 08/02/2023 7.8 (L)  8.6 - 10.5 mg/dL Final    Total Protein 08/02/2023 6.5  6.0 - 8.5  g/dL Final    Albumin 08/02/2023 4.2  3.5 - 5.2 g/dL Final    ALT (SGPT) 08/02/2023 39  1 - 41 U/L Final    AST (SGOT) 08/02/2023 77 (H)  1 - 40 U/L Final    Alkaline Phosphatase 08/02/2023 52  39 - 117 U/L Final    Total Bilirubin 08/02/2023 0.4  0.0 - 1.2 mg/dL Final    Globulin 08/02/2023 2.3  gm/dL Final    A/G Ratio 08/02/2023 1.8  g/dL Final    BUN/Creatinine Ratio 08/02/2023 15.8  7.0 - 25.0 Final    Anion Gap 08/02/2023 15.0  5.0 - 15.0 mmol/L Final    eGFR 08/02/2023 144.6  >60.0 mL/min/1.73 Final    Magnesium 08/02/2023 1.3 (L)  1.6 - 2.6 mg/dL Final    Levetiracetam 08/02/2023 <2.0 (L)  10.0 - 40.0 ug/mL Final    Magnesium 08/03/2023 1.7  1.6 - 2.6 mg/dL Final    WBC 08/03/2023 2.69 (L)  3.40 - 10.80 10*3/mm3 Final    RBC 08/03/2023 3.64 (L)  4.14 - 5.80 10*6/mm3 Final    Hemoglobin 08/03/2023 12.1 (L)  13.0 - 17.7 g/dL Final    Hematocrit 08/03/2023 35.7 (L)  37.5 - 51.0 % Final    MCV 08/03/2023 98.1 (H)  79.0 - 97.0 fL Final    MCH 08/03/2023 33.2 (H)  26.6 - 33.0 pg Final    MCHC 08/03/2023 33.9  31.5 - 35.7 g/dL Final    RDW 08/03/2023 13.2  12.3 - 15.4 % Final    RDW-SD 08/03/2023 47.8  37.0 - 54.0 fl Final    MPV 08/03/2023 11.3  6.0 - 12.0 fL Final    Platelets 08/03/2023 89 (L)  140 - 450 10*3/mm3 Final    Glucose 08/03/2023 86  65 - 99 mg/dL Final    BUN 08/03/2023 7  6 - 20 mg/dL Final    Creatinine 08/03/2023 0.45 (L)  0.76 - 1.27 mg/dL Final    Sodium 08/03/2023 131 (L)  136 - 145 mmol/L Final    Potassium 08/03/2023 4.1  3.5 - 5.2 mmol/L Final    Chloride 08/03/2023 93 (L)  98 - 107 mmol/L Final    CO2 08/03/2023 24.0  22.0 - 29.0 mmol/L Final    Calcium 08/03/2023 8.7  8.6 - 10.5 mg/dL Final    Albumin 08/03/2023 4.0  3.5 - 5.2 g/dL Final    Phosphorus 08/03/2023 2.1 (L)  2.5 - 4.5 mg/dL Final    Anion Gap 08/03/2023 14.0  5.0 - 15.0 mmol/L Final    BUN/Creatinine Ratio 08/03/2023 15.6  7.0 - 25.0 Final    eGFR 08/03/2023 137.4  >60.0 mL/min/1.73 Final    Phosphorus 08/03/2023 2.9  2.5 -  4.5 mg/dL Final        Condition on Discharge:  improved    Vital Signs  Temp:  [96.8 øF (36 øC)-97.5 øF (36.4 øC)] 96.8 øF (36 øC)  Heart Rate:  [56-94] 69  Resp:  [16-20] 18  BP: (101-121)/(67-74) 121/73      Discharge Disposition:  Home or Self Care    Discharge Medications:     Discharge Medications        Continue These Medications        Instructions Start Date   albuterol sulfate  (90 Base) MCG/ACT inhaler  Commonly known as: PROVENTIL HFA;VENTOLIN HFA;PROAIR HFA   2 puffs, Inhalation, Every 4 Hours PRN      busPIRone 15 MG tablet  Commonly known as: BUSPAR   15 mg, Oral, 3 Times Daily PRN      escitalopram 10 MG tablet  Commonly known as: LEXAPRO   10 mg, Oral, Daily      ibuprofen 800 MG tablet  Commonly known as: ADVIL,MOTRIN   800 mg, Oral, Every 8 Hours PRN      levETIRAcetam 500 MG tablet  Commonly known as: KEPPRA   500 mg, Oral, 2 Times Daily      multivitamin tablet tablet   1 tablet, Oral, Daily      thiamine 100 MG tablet  tablet  Commonly known as: VITAMIN B-1   100 mg, Oral, Daily      traZODone 50 MG tablet  Commonly known as: DESYREL    mg, Oral, Nightly PRN               Discharge Diet:    Diet Instructions    As tolerated           Activity at Discharge:    Activity Instructions    As tolerated           Follow-up Appointments  [facility] [address], [place] [address] ú  [phone]     8/8/2023    Time spent in discharge: < 30 min    Clinician:   Daria Sorto MD  08/08/23  12:44 EDT

## 2023-08-08 NOTE — CASE MANAGEMENT/SOCIAL WORK
Case Management/Social Work    Patient Name:  Sachin Limon  YOB: 1985  MRN: [id number]  Admit Date:  8/3/2023    Patient expected to discharge back to [facility] on this date, agency to provide transportation at approximately 13:00. Patient reports improvement in mood and withdrawal symptoms, denies current SI/HI/AVH. Therapist has discussed healthy coping skills and relapse prevention with patient. Therapist has assisted patient in identifying risk factors which would indicate the need for higher level of care including thoughts to harm self or others and/or self-harming behavior. Encouraged patient to notify rehab staff, call [phone] or present to the nearest emergency room should any of these events occur.     Electronically signed by:  HERNANDEZ Taylor  08/08/23 11:18 EDT